# Patient Record
Sex: FEMALE | Race: WHITE | URBAN - METROPOLITAN AREA
[De-identification: names, ages, dates, MRNs, and addresses within clinical notes are randomized per-mention and may not be internally consistent; named-entity substitution may affect disease eponyms.]

---

## 2022-04-04 ENCOUNTER — APPOINTMENT (RX ONLY)
Dept: URBAN - METROPOLITAN AREA CLINIC 40 | Facility: CLINIC | Age: 62
Setting detail: DERMATOLOGY
End: 2022-04-04

## 2022-04-04 DIAGNOSIS — L81.4 OTHER MELANIN HYPERPIGMENTATION: ICD-10-CM | Status: STABLE

## 2022-04-04 DIAGNOSIS — B35.1 TINEA UNGUIUM: ICD-10-CM | Status: INADEQUATELY CONTROLLED

## 2022-04-04 DIAGNOSIS — L82.1 OTHER SEBORRHEIC KERATOSIS: ICD-10-CM | Status: STABLE

## 2022-04-04 DIAGNOSIS — D22 MELANOCYTIC NEVI: ICD-10-CM | Status: STABLE

## 2022-04-04 DIAGNOSIS — L20.89 OTHER ATOPIC DERMATITIS: ICD-10-CM | Status: INADEQUATELY CONTROLLED

## 2022-04-04 DIAGNOSIS — L72.11 PILAR CYST: ICD-10-CM | Status: STABLE

## 2022-04-04 DIAGNOSIS — L57.8 OTHER SKIN CHANGES DUE TO CHRONIC EXPOSURE TO NONIONIZING RADIATION: ICD-10-CM | Status: STABLE

## 2022-04-04 DIAGNOSIS — I78.8 OTHER DISEASES OF CAPILLARIES: ICD-10-CM | Status: STABLE

## 2022-04-04 DIAGNOSIS — D18.0 HEMANGIOMA: ICD-10-CM | Status: STABLE

## 2022-04-04 PROBLEM — D18.01 HEMANGIOMA OF SKIN AND SUBCUTANEOUS TISSUE: Status: ACTIVE | Noted: 2022-04-04

## 2022-04-04 PROBLEM — L20.84 INTRINSIC (ALLERGIC) ECZEMA: Status: ACTIVE | Noted: 2022-04-04

## 2022-04-04 PROBLEM — D22.9 MELANOCYTIC NEVI, UNSPECIFIED: Status: ACTIVE | Noted: 2022-04-04

## 2022-04-04 PROCEDURE — ? ADDITIONAL NOTES

## 2022-04-04 PROCEDURE — ? FULL BODY SKIN EXAM

## 2022-04-04 PROCEDURE — ? COUNSELING

## 2022-04-04 PROCEDURE — ? PRESCRIPTION MEDICATION MANAGEMENT

## 2022-04-04 PROCEDURE — ? PRESCRIPTION

## 2022-04-04 PROCEDURE — ? SUNSCREEN RECOMMENDATIONS

## 2022-04-04 PROCEDURE — 99203 OFFICE O/P NEW LOW 30 MIN: CPT

## 2022-04-04 RX ORDER — CLOBETASOL PROPIONATE 0.5 MG/G
CREAM TOPICAL
Qty: 60 | Refills: 0 | Status: ERX | COMMUNITY
Start: 2022-04-04

## 2022-04-04 RX ADMIN — CLOBETASOL PROPIONATE: 0.5 CREAM TOPICAL at 00:00

## 2022-04-04 ASSESSMENT — LOCATION SIMPLE DESCRIPTION DERM
LOCATION SIMPLE: LEFT ANTERIOR NECK
LOCATION SIMPLE: LEFT FOOT
LOCATION SIMPLE: LEFT HAND
LOCATION SIMPLE: CHEST
LOCATION SIMPLE: LEFT GREAT TOE
LOCATION SIMPLE: ABDOMEN
LOCATION SIMPLE: UPPER BACK
LOCATION SIMPLE: RIGHT SHOULDER
LOCATION SIMPLE: LEFT SHOULDER
LOCATION SIMPLE: RIGHT GREAT TOE
LOCATION SIMPLE: LOWER BACK
LOCATION SIMPLE: POSTERIOR SCALP
LOCATION SIMPLE: RIGHT HAND

## 2022-04-04 ASSESSMENT — LOCATION ZONE DERM
LOCATION ZONE: SCALP
LOCATION ZONE: TOENAIL
LOCATION ZONE: FEET
LOCATION ZONE: NECK
LOCATION ZONE: TRUNK
LOCATION ZONE: HAND
LOCATION ZONE: ARM

## 2022-04-04 ASSESSMENT — LOCATION DETAILED DESCRIPTION DERM
LOCATION DETAILED: UPPER STERNUM
LOCATION DETAILED: LEFT RADIAL DORSAL HAND
LOCATION DETAILED: RIGHT RADIAL DORSAL HAND
LOCATION DETAILED: LEFT SUPERIOR OCCIPITAL SCALP
LOCATION DETAILED: PERIUMBILICAL SKIN
LOCATION DETAILED: LEFT GREAT TOENAIL
LOCATION DETAILED: LEFT DORSAL FOOT
LOCATION DETAILED: RIGHT POSTERIOR SHOULDER
LOCATION DETAILED: SUPERIOR THORACIC SPINE
LOCATION DETAILED: EPIGASTRIC SKIN
LOCATION DETAILED: RIGHT GREAT TOENAIL
LOCATION DETAILED: SUPERIOR LUMBAR SPINE
LOCATION DETAILED: LEFT POSTERIOR SHOULDER
LOCATION DETAILED: LEFT CLAVICULAR NECK
LOCATION DETAILED: LEFT OCCIPITAL SCALP
LOCATION DETAILED: RIGHT ANTERIOR SHOULDER
LOCATION DETAILED: INFERIOR THORACIC SPINE

## 2022-04-04 NOTE — PROCEDURE: ADDITIONAL NOTES
Additional Notes: Patient consent was obtained to proceed with the visit and recommended plan of care after discussion of all risks and benefits, including the risks of COVID-19 exposure.
Detail Level: Simple
Additional Notes: - Defer the 4.5cm to general surgery
Detail Level: Zone
Render Risk Assessment In Note?: no

## 2022-04-04 NOTE — PROCEDURE: PRESCRIPTION MEDICATION MANAGEMENT
Initiate Treatment: - clobetasol 0.05 % topical cream \\nSig: Apply to the hands twice a day for up to 2 weeks, AVOID face, nipples, and groin area\\n\\n- OTC Cutemol moisturizer daily
Detail Level: Zone
Render In Strict Bullet Format?: Yes

## 2022-04-04 NOTE — HPI: FULL BODY SKIN EXAMINATION
What Type Of Note Output Would You Prefer (Optional)?: Standard Output
What Is The Reason For Today's Visit?: Full Body Skin Examination
What Is The Reason For Today's Visit? (Being Monitored For X): concerning skin lesions on an annual basis
How Severe Are Your Spot(S)?: mild
Additional History: Pt has some spots on both hands that get dry and itchy.

## 2022-04-04 NOTE — PROCEDURE: SUNSCREEN RECOMMENDATIONS
General Sunscreen Counseling: I recommended a broad spectrum sunscreen with a SPF of 50 or higher.  I explained that SPF 50 sunscreens block approximately 97 percent of the sun's harmful rays.  Sunscreens should be applied at least 30 minutes prior to expected sun exposure and then every 2 hours after that as long as sun exposure continues. If swimming or exercising sunscreen should be reapplied every 45 minutes to an hour after getting wet or sweating.  One ounce, or the equivalent of a shot glass full of sunscreen, is adequate to protect the skin not covered by a bathing suit. I also recommended a lip balm with a sunscreen as well. Sun protective clothing can be used in lieu of sunscreen but must be worn the entire time you are exposed to the sun's rays.
Detail Level: Detailed
Detail Level: Zone

## 2022-04-22 ENCOUNTER — APPOINTMENT (RX ONLY)
Dept: URBAN - METROPOLITAN AREA CLINIC 40 | Facility: CLINIC | Age: 62
Setting detail: DERMATOLOGY
End: 2022-04-22

## 2022-04-22 DIAGNOSIS — L72.11 PILAR CYST: ICD-10-CM

## 2022-04-22 PROCEDURE — ? PUNCH EXCISION

## 2022-04-22 PROCEDURE — 11422 EXC H-F-NK-SP B9+MARG 1.1-2: CPT

## 2022-04-22 PROCEDURE — ? ADDITIONAL NOTES

## 2022-04-22 ASSESSMENT — LOCATION DETAILED DESCRIPTION DERM: LOCATION DETAILED: LEFT SUPERIOR PARIETAL SCALP

## 2022-04-22 ASSESSMENT — LOCATION ZONE DERM: LOCATION ZONE: SCALP

## 2022-04-22 ASSESSMENT — LOCATION SIMPLE DESCRIPTION DERM: LOCATION SIMPLE: SCALP

## 2022-04-22 NOTE — PROCEDURE: PUNCH EXCISION
Size Of Lesion In Cm: 2
X Size Of Lesion In Cm (Optional): 0
Anesthesia Volume In Cc: 6
Excision Method: 4 mm Punch
Repair Type: None (Simple)
Intermediate / Complex Repair - Final Wound Length In Cm: 1
Complex Requirements: Extensive Undermining Performed?: No
Undermining Type: Entire Wound
Debridement Text: The wound edges were debrided prior to proceeding with the closure to facilitate wound healing.
Helical Rim Text: The closure involved the helical rim.
Vermilion Border Text: The closure involved the vermilion border.
Nostril Rim Text: The closure involved the nostril rim.
Retention Suture Text: Retention sutures were placed to support the closure and prevent dehiscence.
Suture Removal: 14 days
Lab Facility: 3
Detail Level: Detailed
Excision Depth: adipose tissue
Medical Necessity Clause: The excision was medically necessary because the lesion which was excised was
Anesthesia Type: 1% lidocaine with epinephrine
Hemostasis: Manual Pressure
Estimated Blood Loss (Cc): minimal
Epidermal Sutures: 3-0 Prolene
Epidermal Closure: simple interrupted
Wound Care: Petrolatum
Dressing: dry sterile dressing
Complex Repair Preamble Text (Leave Blank If You Do Not Want): Extensive wide undermining was performed.
Intermediate Repair Preamble Text (Leave Blank If You Do Not Want): Undermining was performed with blunt dissection.
1.5 Mm Punch Excision Text: A 1.5 mm punch was used to make an initial incision over the lesion.  After this overlying column of skin was removed, blunt dissection was used to free the lesion from the surrounding tissues and the lesion was extirpated through the surgical opening made by the punch biopsy.
2 Mm Punch Excision Text: A 2 mm punch was used to make an initial incision over the lesion.  After this overlying column of skin was removed, blunt dissection was used to free the lesion from the surrounding tissues and the lesion was extirpated through the surgical opening made by the punch biopsy.
2.5 Mm Punch Excision Text: A 2.5 mm punch was used to make an initial incision over the lesion.  After this overlying column of skin was removed, blunt dissection was used to free the lesion from the surrounding tissues and the lesion was extirpated through the surgical opening made by the punch biopsy.
3 Mm Punch Excision Text: A 3 mm punch was used to make an initial incision over the lesion.  After this overlying column of skin was removed, blunt dissection was used to free the lesion from the surrounding tissues and the lesion was extirpated through the surgical opening made by the punch biopsy.
3.5 Mm Punch Excision Text: A 3.5 mm punch was used to make an initial incision over the lesion.  After this overlying column of skin was removed, blunt dissection was used to free the lesion from the surrounding tissues and the lesion was extirpated through the surgical opening made by the punch biopsy.
4 Mm Punch Excision Text: A 4 mm punch was used to make an initial incision over the lesion.  After this overlying column of skin was removed, blunt dissection was used to free the lesion from the surrounding tissues and the lesion was extirpated through the surgical opening made by the punch biopsy.
4.5 Mm Punch Excision Text: A 4.5 mm punch was used to make an initial incision over the lesion.  After this overlying column of skin was removed, blunt dissection was used to free the lesion from the surrounding tissues and the lesion was extirpated through the surgical opening made by the punch biopsy.
5 Mm Punch Excision Text: A 5 mm punch was used to make an initial incision over the lesion.  After this overlying column of skin was removed, blunt dissection was used to free the lesion from the surrounding tissues and the lesion was extirpated through the surgical opening made by the punch biopsy.
6 Mm Punch Excision Text: A 6 mm punch was used to make an initial incision over the lesion.  After this overlying column of skin was removed, blunt dissection was used to free the lesion from the surrounding tissues and the lesion was extirpated through the surgical opening made by the punch biopsy.
7 Mm Punch Excision Text: A 7 mm punch was used to make an initial incision over the lesion.  After this overlying column of skin was removed, blunt dissection was used to free the lesion from the surrounding tissues and the lesion was extirpated through the surgical opening made by the punch biopsy.
8 Mm Punch Excision Text: A 8 mm punch was used to make an initial incision over the lesion.  After this overlying column of skin was removed, blunt dissection was used to free the lesion from the surrounding tissues and the lesion was extirpated through the surgical opening made by the punch biopsy.
10 Mm Punch Excision Text: A 10 mm punch was used to make an initial incision over the lesion.  After this overlying column of skin was removed, blunt dissection was used to free the lesion from the surrounding tissues and the lesion was extirpated through the surgical opening made by the punch biopsy.
12 Mm Punch Excision Text: A 12 mm punch was used to make an initial incision over the lesion.  After this overlying column of skin was removed, blunt dissection was used to free the lesion from the surrounding tissues and the lesion was extirpated through the surgical opening made by the punch biopsy.
Purse String (Intermediate) Text: Given the location of the defect and the characteristics of the surrounding skin a purse string intermediate closure was deemed most appropriate.  Undermining was performed circumfirentially around the surgical defect.  A purse string suture was then placed and tightened.
Path Notes (To The Dermatopathologist): Please check margins.
Medical Necessity Clause: This procedure was medically necessary because the lesion that was treated was:
Consent was obtained from the patient. The risks and benefits to therapy were discussed in detail. Specifically, the risks of infection, scarring, bleeding, prolonged wound healing, incomplete removal, allergy to anesthesia, nerve injury and recurrence were addressed. Prior to the procedure, the treatment site was clearly identified and confirmed by the patient. All components of Universal Protocol/PAUSE Rule completed.
Render Post-Care Instructions In Note?: yes
Post-Care Instructions: I reviewed with the patient in detail post-care instructions. Patient is not to engage in any heavy lifting, exercise, or swimming for the next 14 days. Should the patient develop any fevers, chills, bleeding, severe pain patient will contact the office immediately.
Billing Type: Third-Party Bill

## 2022-04-25 ENCOUNTER — APPOINTMENT (RX ONLY)
Dept: URBAN - METROPOLITAN AREA CLINIC 40 | Facility: CLINIC | Age: 62
Setting detail: DERMATOLOGY
End: 2022-04-25

## 2022-04-25 DIAGNOSIS — L72.11 PILAR CYST: ICD-10-CM

## 2022-04-25 PROCEDURE — 11421 EXC H-F-NK-SP B9+MARG 0.6-1: CPT | Mod: 79

## 2022-04-25 PROCEDURE — ? PUNCH EXCISION

## 2022-04-25 ASSESSMENT — LOCATION SIMPLE DESCRIPTION DERM: LOCATION SIMPLE: POSTERIOR SCALP

## 2022-04-25 ASSESSMENT — LOCATION DETAILED DESCRIPTION DERM: LOCATION DETAILED: LEFT POSTERIOR PARIETAL SCALP

## 2022-04-25 ASSESSMENT — LOCATION ZONE DERM: LOCATION ZONE: SCALP

## 2022-04-25 NOTE — PROCEDURE: PUNCH EXCISION
Referring Physician (Optional): Tressa Saeed PA-C
Size Of Lesion In Cm: 1
X Size Of Lesion In Cm (Optional): 0
Anesthesia Volume In Cc: 6
Excision Method: 4 mm Punch
Repair Type: None (Simple)
Complex Requirements: Extensive Undermining Performed?: No
Undermining Type: Entire Wound
Debridement Text: The wound edges were debrided prior to proceeding with the closure to facilitate wound healing.
Helical Rim Text: The closure involved the helical rim.
Vermilion Border Text: The closure involved the vermilion border.
Nostril Rim Text: The closure involved the nostril rim.
Retention Suture Text: Retention sutures were placed to support the closure and prevent dehiscence.
Suture Removal: 10 days
Lab Facility: 3
Detail Level: Detailed
Excision Depth: adipose tissue
Medical Necessity Clause: The excision was medically necessary because the lesion which was excised was
Anesthesia Type: 1% lidocaine with epinephrine
Hemostasis: Manual Pressure
Estimated Blood Loss (Cc): minimal
Anesthesia Volume In Cc: 2
Epidermal Sutures: 3-0 Prolene
Epidermal Closure: simple interrupted
Wound Care: Petrolatum
Dressing: dry sterile dressing
Complex Repair Preamble Text (Leave Blank If You Do Not Want): Extensive wide undermining was performed.
Intermediate Repair Preamble Text (Leave Blank If You Do Not Want): Undermining was performed with blunt dissection.
1.5 Mm Punch Excision Text: A 1.5 mm punch was used to make an initial incision over the lesion.  After this overlying column of skin was removed, blunt dissection was used to free the lesion from the surrounding tissues and the lesion was extirpated through the surgical opening made by the punch biopsy.
2 Mm Punch Excision Text: A 2 mm punch was used to make an initial incision over the lesion.  After this overlying column of skin was removed, blunt dissection was used to free the lesion from the surrounding tissues and the lesion was extirpated through the surgical opening made by the punch biopsy.
2.5 Mm Punch Excision Text: A 2.5 mm punch was used to make an initial incision over the lesion.  After this overlying column of skin was removed, blunt dissection was used to free the lesion from the surrounding tissues and the lesion was extirpated through the surgical opening made by the punch biopsy.
3 Mm Punch Excision Text: A 3 mm punch was used to make an initial incision over the lesion.  After this overlying column of skin was removed, blunt dissection was used to free the lesion from the surrounding tissues and the lesion was extirpated through the surgical opening made by the punch biopsy.
3.5 Mm Punch Excision Text: A 3.5 mm punch was used to make an initial incision over the lesion.  After this overlying column of skin was removed, blunt dissection was used to free the lesion from the surrounding tissues and the lesion was extirpated through the surgical opening made by the punch biopsy.
4 Mm Punch Excision Text: A 4 mm punch was used to make an initial incision over the lesion.  After this overlying column of skin was removed, blunt dissection was used to free the lesion from the surrounding tissues and the lesion was extirpated through the surgical opening made by the punch biopsy.
4.5 Mm Punch Excision Text: A 4.5 mm punch was used to make an initial incision over the lesion.  After this overlying column of skin was removed, blunt dissection was used to free the lesion from the surrounding tissues and the lesion was extirpated through the surgical opening made by the punch biopsy.
5 Mm Punch Excision Text: A 5 mm punch was used to make an initial incision over the lesion.  After this overlying column of skin was removed, blunt dissection was used to free the lesion from the surrounding tissues and the lesion was extirpated through the surgical opening made by the punch biopsy.
6 Mm Punch Excision Text: A 6 mm punch was used to make an initial incision over the lesion.  After this overlying column of skin was removed, blunt dissection was used to free the lesion from the surrounding tissues and the lesion was extirpated through the surgical opening made by the punch biopsy.
7 Mm Punch Excision Text: A 7 mm punch was used to make an initial incision over the lesion.  After this overlying column of skin was removed, blunt dissection was used to free the lesion from the surrounding tissues and the lesion was extirpated through the surgical opening made by the punch biopsy.
8 Mm Punch Excision Text: A 8 mm punch was used to make an initial incision over the lesion.  After this overlying column of skin was removed, blunt dissection was used to free the lesion from the surrounding tissues and the lesion was extirpated through the surgical opening made by the punch biopsy.
10 Mm Punch Excision Text: A 10 mm punch was used to make an initial incision over the lesion.  After this overlying column of skin was removed, blunt dissection was used to free the lesion from the surrounding tissues and the lesion was extirpated through the surgical opening made by the punch biopsy.
12 Mm Punch Excision Text: A 12 mm punch was used to make an initial incision over the lesion.  After this overlying column of skin was removed, blunt dissection was used to free the lesion from the surrounding tissues and the lesion was extirpated through the surgical opening made by the punch biopsy.
Purse String (Intermediate) Text: Given the location of the defect and the characteristics of the surrounding skin a purse string intermediate closure was deemed most appropriate.  Undermining was performed circumfirentially around the surgical defect.  A purse string suture was then placed and tightened.
Path Notes (To The Dermatopathologist): Please check margins.
Medical Necessity Clause: This procedure was medically necessary because the lesion that was treated was:
Consent was obtained from the patient. The risks and benefits to therapy were discussed in detail. Specifically, the risks of infection, scarring, bleeding, prolonged wound healing, incomplete removal, allergy to anesthesia, nerve injury and recurrence were addressed. Prior to the procedure, the treatment site was clearly identified and confirmed by the patient. All components of Universal Protocol/PAUSE Rule completed.
Render Post-Care Instructions In Note?: yes
Post-Care Instructions: I reviewed with the patient in detail post-care instructions. Patient is not to engage in any heavy lifting, exercise, or swimming for the next 14 days. Should the patient develop any fevers, chills, bleeding, severe pain patient will contact the office immediately.
Billing Type: Third-Party Bill

## 2022-05-02 ENCOUNTER — APPOINTMENT (RX ONLY)
Dept: URBAN - METROPOLITAN AREA CLINIC 40 | Facility: CLINIC | Age: 62
Setting detail: DERMATOLOGY
End: 2022-05-02

## 2022-05-02 DIAGNOSIS — Z48.02 ENCOUNTER FOR REMOVAL OF SUTURES: ICD-10-CM | Status: RESOLVED

## 2022-05-02 PROCEDURE — ? SUTURE REMOVAL (GLOBAL PERIOD)

## 2022-05-02 PROCEDURE — 99024 POSTOP FOLLOW-UP VISIT: CPT

## 2022-05-02 ASSESSMENT — LOCATION DETAILED DESCRIPTION DERM
LOCATION DETAILED: LEFT POSTERIOR PARIETAL SCALP
LOCATION DETAILED: LEFT POSTERIOR PARIETAL SCALP

## 2022-05-02 ASSESSMENT — LOCATION SIMPLE DESCRIPTION DERM
LOCATION SIMPLE: POSTERIOR SCALP
LOCATION SIMPLE: POSTERIOR SCALP

## 2022-05-02 ASSESSMENT — LOCATION ZONE DERM
LOCATION ZONE: SCALP
LOCATION ZONE: SCALP

## 2022-05-02 NOTE — PROCEDURE: SUTURE REMOVAL (GLOBAL PERIOD)
Detail Level: Detailed
Add 56623 Cpt? (Important Note: In 2017 The Use Of 35347 Is Being Tracked By Cms To Determine Future Global Period Reimbursement For Global Periods): yes

## 2023-07-27 ENCOUNTER — APPOINTMENT (RX ONLY)
Dept: URBAN - METROPOLITAN AREA CLINIC 40 | Facility: CLINIC | Age: 63
Setting detail: DERMATOLOGY
End: 2023-07-27

## 2023-07-27 DIAGNOSIS — D18.0 HEMANGIOMA: ICD-10-CM | Status: STABLE

## 2023-07-27 DIAGNOSIS — L0390 CELLULITIS AND ABSCESS OF UNSPECIFIED SITES: ICD-10-CM | Status: INADEQUATELY CONTROLLED

## 2023-07-27 DIAGNOSIS — L72.11 PILAR CYST: ICD-10-CM | Status: STABLE

## 2023-07-27 DIAGNOSIS — D22 MELANOCYTIC NEVI: ICD-10-CM | Status: STABLE

## 2023-07-27 DIAGNOSIS — L57.8 OTHER SKIN CHANGES DUE TO CHRONIC EXPOSURE TO NONIONIZING RADIATION: ICD-10-CM | Status: STABLE

## 2023-07-27 DIAGNOSIS — L0391 CELLULITIS AND ABSCESS OF UNSPECIFIED SITES: ICD-10-CM | Status: INADEQUATELY CONTROLLED

## 2023-07-27 DIAGNOSIS — L82.1 OTHER SEBORRHEIC KERATOSIS: ICD-10-CM | Status: STABLE

## 2023-07-27 DIAGNOSIS — L81.4 OTHER MELANIN HYPERPIGMENTATION: ICD-10-CM | Status: STABLE

## 2023-07-27 DIAGNOSIS — L20.89 OTHER ATOPIC DERMATITIS: ICD-10-CM | Status: STABLE

## 2023-07-27 PROBLEM — D22.9 MELANOCYTIC NEVI, UNSPECIFIED: Status: ACTIVE | Noted: 2023-07-27

## 2023-07-27 PROBLEM — D18.01 HEMANGIOMA OF SKIN AND SUBCUTANEOUS TISSUE: Status: ACTIVE | Noted: 2023-07-27

## 2023-07-27 PROBLEM — L03.115 CELLULITIS OF RIGHT LOWER LIMB: Status: ACTIVE | Noted: 2023-07-27

## 2023-07-27 PROCEDURE — ? SUNSCREEN RECOMMENDATIONS

## 2023-07-27 PROCEDURE — ? COUNSELING

## 2023-07-27 PROCEDURE — ? PRESCRIPTION MEDICATION MANAGEMENT

## 2023-07-27 PROCEDURE — ? PRESCRIPTION

## 2023-07-27 PROCEDURE — ? DIAGNOSIS COMMENT

## 2023-07-27 PROCEDURE — ? FULL BODY SKIN EXAM

## 2023-07-27 PROCEDURE — ? PHOTO-DOCUMENTATION

## 2023-07-27 PROCEDURE — ? MEDICATION COUNSELING

## 2023-07-27 PROCEDURE — 99214 OFFICE O/P EST MOD 30 MIN: CPT

## 2023-07-27 RX ORDER — DOXYCYCLINE 100 MG/1
CAPSULE ORAL
Qty: 20 | Refills: 0 | Status: ERX | COMMUNITY
Start: 2023-07-27

## 2023-07-27 RX ORDER — CLOBETASOL PROPIONATE 0.5 MG/G
CREAM TOPICAL
Qty: 60 | Refills: 0 | Status: ERX

## 2023-07-27 RX ADMIN — DOXYCYCLINE: 100 CAPSULE ORAL at 00:00

## 2023-07-27 ASSESSMENT — LOCATION DETAILED DESCRIPTION DERM
LOCATION DETAILED: UPPER STERNUM
LOCATION DETAILED: LEFT POSTERIOR SHOULDER
LOCATION DETAILED: SUPERIOR THORACIC SPINE
LOCATION DETAILED: RIGHT LATERAL DISTAL PRETIBIAL REGION
LOCATION DETAILED: SUPERIOR LUMBAR SPINE
LOCATION DETAILED: LEFT INFERIOR POSTAURICULAR SKIN
LOCATION DETAILED: LEFT PROXIMAL PRETIBIAL REGION
LOCATION DETAILED: EPIGASTRIC SKIN
LOCATION DETAILED: RIGHT ANTERIOR SHOULDER
LOCATION DETAILED: RIGHT POSTERIOR SHOULDER
LOCATION DETAILED: LEFT CLAVICULAR NECK
LOCATION DETAILED: PERIUMBILICAL SKIN
LOCATION DETAILED: INFERIOR THORACIC SPINE

## 2023-07-27 ASSESSMENT — LOCATION SIMPLE DESCRIPTION DERM
LOCATION SIMPLE: UPPER BACK
LOCATION SIMPLE: ABDOMEN
LOCATION SIMPLE: RIGHT SHOULDER
LOCATION SIMPLE: LEFT SHOULDER
LOCATION SIMPLE: LOWER BACK
LOCATION SIMPLE: RIGHT PRETIBIAL REGION
LOCATION SIMPLE: POSTERIOR SCALP
LOCATION SIMPLE: LEFT PRETIBIAL REGION
LOCATION SIMPLE: CHEST
LOCATION SIMPLE: LEFT ANTERIOR NECK

## 2023-07-27 ASSESSMENT — LOCATION ZONE DERM
LOCATION ZONE: SCALP
LOCATION ZONE: ARM
LOCATION ZONE: LEG
LOCATION ZONE: TRUNK
LOCATION ZONE: NECK

## 2023-07-27 NOTE — PROCEDURE: MEDICATION COUNSELING
complains of pain/discomfort Rinvoq Counseling: I discussed with the patient the risks of Rinvoq therapy including but not limited to upper respiratory tract infections, shingles, cold sores, bronchitis, nausea, cough, fever, acne, and headache. Live vaccines should be avoided.  This medication has been linked to serious infections; higher rate of mortality; malignancy and lymphoproliferative disorders; major adverse cardiovascular events; thrombosis; thrombocytopenia, anemia, and neutropenia; lipid elevations; liver enzyme elevations; and gastrointestinal perforations.

## 2023-07-27 NOTE — PROCEDURE: MEDICATION COUNSELING
Never smoker Opzelura Pregnancy And Lactation Text: There is insufficient data to evaluate drug-associated risk for major birth defects, miscarriage, or other adverse maternal or fetal outcomes.  There is a pregnancy registry that monitors pregnancy outcomes in pregnant persons exposed to the medication during pregnancy.  It is unknown if this medication is excreted in breast milk.  Do not breastfeed during treatment and for about 4 weeks after the last dose.

## 2023-07-27 NOTE — HPI: EVALUATION OF SKIN LESION(S)
What Type Of Note Output Would You Prefer (Optional)?: Bullet Format
Hpi Title: Evaluation of Skin Lesions
Have Your Spot(S) Been Treated In The Past?: has not been treated
Additional History: Pt reports there is rash on the lower right leg, she states it’s been present for about 2 months she originally thought it was poison ivy, she says it’s occasionally does it and about 2 weeks ago it was burning. Pt has been using otc cortisone but doesn’t make it go away.

## 2023-07-27 NOTE — PROCEDURE: MEDICATION COUNSELING
Medication: Fenofibrate  Sig: unknown  Qty: unknown  Dosage: 160mg  Last Refill: unknown  Pharmacy: [facility]  Patient last seen: 10/12/16  Next appointment to be seen: none scheduled    Medication: Glimepiride  Sig: unknown  Qty: unknown  Dosage: 2mg  Last Refill: unknown  Pharmacy: [facility]  Patient last seen: 10/12/16  Next appointment to be seen: none scheduled    Medication: Lisinoprid  Sig: unknown  Qty: unknown  Dosage: 10mg  Last Refill:unknown  Pharmacy: [facility]  Patient last seen: 10/12/16  Next appointment to be seen: none scheduled    Medication: Metformin  Sig: unknown  Qty: unknown  Dosage: 500mg  Last Refill:unknown   Pharmacy: [facility]  Patient last seen: 10/12/16  Next appointment to be seen: none scheduled    Patient came in with a list of medications for refills but did not have most of the information.  He says he needs the scripts to go to [facility] but had no other information to give.     Drysol Counseling:  I discussed with the patient the risks of drysol/aluminum chloride including but not limited to skin rash, itching, irritation, burning.

## 2023-07-27 NOTE — PROCEDURE: PRESCRIPTION MEDICATION MANAGEMENT
Initiate Treatment: Doxycycline 100mg bid x10 days \\nClobetasol cream bid x3 weeks
Detail Level: Zone
Render In Strict Bullet Format?: Yes
Initiate Treatment: Clobetasol cream bid x10 days during flares

## 2023-07-27 NOTE — PROCEDURE: SUNSCREEN RECOMMENDATIONS
General Sunscreen Counseling: I recommended a broad spectrum sunscreen with a SPF of 50 or higher.  I explained that SPF 50 sunscreens block approximately 97 percent of the sun's harmful rays.  Sunscreens should be applied at least 30 minutes prior to expected sun exposure and then every 2 hours after that as long as sun exposure continues. If swimming or exercising sunscreen should be reapplied every 45 minutes to an hour after getting wet or sweating.  One ounce, or the equivalent of a shot glass full of sunscreen, is adequate to protect the skin not covered by a bathing suit. I also recommended a lip balm with a sunscreen as well. Sun protective clothing can be used in lieu of sunscreen but must be worn the entire time you are exposed to the sun's rays.
Detail Level: Zone
Detail Level: Detailed
Detail Level: Zone
Detail Level: Simple
Detail Level: Generalized

## 2023-07-27 NOTE — PROCEDURE: DIAGNOSIS COMMENT
Detail Level: Zone
Render Risk Assessment In Note?: no
Comment: 4cm subcutaneous nodule \\nReferred to general surgery if removal desired
Comment: 10x6cm plaque \\nRTC in 3 weeks

## 2023-08-24 ENCOUNTER — APPOINTMENT (RX ONLY)
Dept: URBAN - METROPOLITAN AREA CLINIC 40 | Facility: CLINIC | Age: 63
Setting detail: DERMATOLOGY
End: 2023-08-24

## 2023-08-24 DIAGNOSIS — L0390 CELLULITIS AND ABSCESS OF UNSPECIFIED SITES: ICD-10-CM | Status: RESOLVING

## 2023-08-24 DIAGNOSIS — L0391 CELLULITIS AND ABSCESS OF UNSPECIFIED SITES: ICD-10-CM | Status: RESOLVING

## 2023-08-24 PROBLEM — L03.115 CELLULITIS OF RIGHT LOWER LIMB: Status: ACTIVE | Noted: 2023-08-24

## 2023-08-24 PROCEDURE — 99213 OFFICE O/P EST LOW 20 MIN: CPT

## 2023-08-24 PROCEDURE — ? PHOTO-DOCUMENTATION

## 2023-08-24 PROCEDURE — ? COUNSELING

## 2023-08-24 PROCEDURE — ? PRESCRIPTION MEDICATION MANAGEMENT

## 2023-08-24 PROCEDURE — ? PRESCRIPTION

## 2023-08-24 PROCEDURE — ? ORDER TESTS

## 2023-08-24 RX ORDER — MUPIROCIN 20 MG/G
OINTMENT TOPICAL
Qty: 22 | Refills: 0 | Status: ERX | COMMUNITY
Start: 2023-08-24

## 2023-08-24 RX ADMIN — MUPIROCIN: 20 OINTMENT TOPICAL at 00:00

## 2023-08-24 ASSESSMENT — LOCATION ZONE DERM: LOCATION ZONE: LEG

## 2023-08-24 ASSESSMENT — LOCATION SIMPLE DESCRIPTION DERM: LOCATION SIMPLE: RIGHT PRETIBIAL REGION

## 2023-08-24 ASSESSMENT — LOCATION DETAILED DESCRIPTION DERM: LOCATION DETAILED: RIGHT LATERAL DISTAL PRETIBIAL REGION

## 2023-08-24 NOTE — PROCEDURE: PRESCRIPTION MEDICATION MANAGEMENT
Initiate Treatment: :\\n- mupirocin 2 % topical ointment to wound bid x 2 weeks
Plan: :\\n- Obtained bacterial cx. Hold on antibiotic pending results
Detail Level: Zone
Continue Regimen: :\\n- Clobetasol cream bid x 2 weeks
Render In Strict Bullet Format?: Yes

## 2023-08-24 NOTE — PROCEDURE: ORDER TESTS
Billing Type: Third-Party Bill
Bill For Surgical Tray: no
Performing Laboratory: [id number]
Expected Date Of Service: 08/24/2023

## 2023-09-20 ENCOUNTER — APPOINTMENT (RX ONLY)
Dept: URBAN - METROPOLITAN AREA CLINIC 40 | Facility: CLINIC | Age: 63
Setting detail: DERMATOLOGY
End: 2023-09-20

## 2023-09-20 DIAGNOSIS — L0391 CELLULITIS AND ABSCESS OF UNSPECIFIED SITES: ICD-10-CM | Status: INADEQUATELY CONTROLLED

## 2023-09-20 DIAGNOSIS — L0390 CELLULITIS AND ABSCESS OF UNSPECIFIED SITES: ICD-10-CM | Status: INADEQUATELY CONTROLLED

## 2023-09-20 PROBLEM — L03.115 CELLULITIS OF RIGHT LOWER LIMB: Status: ACTIVE | Noted: 2023-09-20

## 2023-09-20 PROCEDURE — ? DIAGNOSIS COMMENT

## 2023-09-20 PROCEDURE — ? PHOTO-DOCUMENTATION

## 2023-09-20 PROCEDURE — ? COUNSELING

## 2023-09-20 PROCEDURE — ? ORDER TESTS

## 2023-09-20 PROCEDURE — ? PRESCRIPTION MEDICATION MANAGEMENT

## 2023-09-20 PROCEDURE — ? PRESCRIPTION

## 2023-09-20 PROCEDURE — ? MEDICATION COUNSELING

## 2023-09-20 PROCEDURE — 99214 OFFICE O/P EST MOD 30 MIN: CPT

## 2023-09-20 RX ORDER — MUPIROCIN 20 MG/G
OINTMENT TOPICAL
Qty: 22 | Refills: 0 | Status: ERX

## 2023-09-20 RX ORDER — DOXYCYCLINE 100 MG/1
CAPSULE ORAL
Qty: 20 | Refills: 0 | Status: ERX | COMMUNITY
Start: 2023-09-20

## 2023-09-20 RX ORDER — DESOXIMETASONE 0.5 MG/G
OINTMENT TOPICAL
Qty: 60 | Refills: 1 | Status: ERX | COMMUNITY
Start: 2023-09-20

## 2023-09-20 RX ADMIN — DESOXIMETASONE: 0.5 OINTMENT TOPICAL at 00:00

## 2023-09-20 RX ADMIN — DOXYCYCLINE: 100 CAPSULE ORAL at 00:00

## 2023-09-20 ASSESSMENT — LOCATION SIMPLE DESCRIPTION DERM: LOCATION SIMPLE: RIGHT PRETIBIAL REGION

## 2023-09-20 ASSESSMENT — LOCATION DETAILED DESCRIPTION DERM: LOCATION DETAILED: RIGHT LATERAL DISTAL PRETIBIAL REGION

## 2023-09-20 ASSESSMENT — LOCATION ZONE DERM: LOCATION ZONE: LEG

## 2023-09-20 NOTE — PROCEDURE: PRESCRIPTION MEDICATION MANAGEMENT
Initiate Treatment: :\\n- mupirocin 2 % topical ointment to wound bid x 2 weeks\\n \\n- doxycycline monohydrate 100 mg capsule \\nSig: Take 1 pill po bid x10 days\\n\\n- Topicort 0.05 % topical ointment \\nSig: Apply to aa on leg 2 times daily.
Detail Level: Zone
Render In Strict Bullet Format?: Yes

## 2023-09-20 NOTE — PROCEDURE: ORDER TESTS
Billing Type: Third-Party Bill
Bill For Surgical Tray: no
Performing Laboratory: [id number]
Expected Date Of Service: 09/20/2023
Lab Facility: [id number]

## 2023-09-20 NOTE — PROCEDURE: MEDICATION COUNSELING
Post-Care Instructions: I reviewed with the patient in detail post-care instructions. Patient is to wear sunprotection, and avoid picking at any of the treated lesions. Pt may apply Vaseline to crusted or scabbing areas. Detail Level: Detailed Consent: The patient's consent was obtained including but not limited to risks of crusting, scabbing, blistering, scarring, darker or lighter pigmentary change, recurrence, incomplete removal and infection. Render Post-Care Instructions In Note?: yes Duration Of Freeze Thaw-Cycle (Seconds): 0 Nsaids Pregnancy And Lactation Text: These medications are considered safe up to 30 weeks gestation. It is excreted in breast milk.

## 2023-09-20 NOTE — PROCEDURE: DIAGNOSIS COMMENT
Render Risk Assessment In Note?: no
Comment: Pedicure Q 3-4 weeks.  Patient will hold for now.  If no improvement at f/u, perform bx to r/o atypical mycobacterium.
Detail Level: Zone

## 2023-10-11 ENCOUNTER — APPOINTMENT (RX ONLY)
Dept: URBAN - METROPOLITAN AREA CLINIC 40 | Facility: CLINIC | Age: 63
Setting detail: DERMATOLOGY
End: 2023-10-11

## 2023-10-11 DIAGNOSIS — R21 RASH AND OTHER NONSPECIFIC SKIN ERUPTION: ICD-10-CM | Status: INADEQUATELY CONTROLLED

## 2023-10-11 PROCEDURE — ? DIAGNOSIS COMMENT

## 2023-10-11 PROCEDURE — ? PRESCRIPTION

## 2023-10-11 PROCEDURE — ? MEDICATION COUNSELING

## 2023-10-11 PROCEDURE — ? COUNSELING

## 2023-10-11 PROCEDURE — 11104 PUNCH BX SKIN SINGLE LESION: CPT

## 2023-10-11 PROCEDURE — ? ORDER TESTS

## 2023-10-11 PROCEDURE — ? PUNCH BIOPSY FOR TISSUE CULTURE

## 2023-10-11 PROCEDURE — ? PRESCRIPTION MEDICATION MANAGEMENT

## 2023-10-11 RX ORDER — MINOCYCLINE HYDROCHLORIDE 100 MG/1
CAPSULE ORAL
Qty: 60 | Refills: 0 | Status: ERX | COMMUNITY
Start: 2023-10-11

## 2023-10-11 RX ADMIN — MINOCYCLINE HYDROCHLORIDE: 100 CAPSULE ORAL at 00:00

## 2023-10-11 ASSESSMENT — LOCATION ZONE DERM: LOCATION ZONE: LEG

## 2023-10-11 ASSESSMENT — LOCATION DETAILED DESCRIPTION DERM
LOCATION DETAILED: RIGHT DISTAL PRETIBIAL REGION
LOCATION DETAILED: RIGHT LATERAL DISTAL PRETIBIAL REGION

## 2023-10-11 ASSESSMENT — LOCATION SIMPLE DESCRIPTION DERM: LOCATION SIMPLE: RIGHT PRETIBIAL REGION

## 2023-10-11 NOTE — PROCEDURE: MEDICATION COUNSELING
oral SSKI Counseling:  I discussed with the patient the risks of SSKI including but not limited to thyroid abnormalities, metallic taste, GI upset, fever, headache, acne, arthralgias, paraesthesias, lymphadenopathy, easy bleeding, arrhythmias, and allergic reaction.

## 2023-10-11 NOTE — PROCEDURE: ORDER TESTS
Expected Date Of Service: 10/11/2023
Performing Laboratory: 0
Billing Type: Third-Party Bill
Bill For Surgical Tray: no

## 2023-10-11 NOTE — PROCEDURE: MEDICATION COUNSELING
Ivermectin Pregnancy And Lactation Text: This medication is Pregnancy Category C and it isn't known if it is safe during pregnancy. It is also excreted in breast milk. Imiquimod Pregnancy And Lactation Text: This medication is Pregnancy Category C. It is unknown if this medication is excreted in breast milk.

## 2023-10-11 NOTE — PROCEDURE: PRESCRIPTION MEDICATION MANAGEMENT
2022      Gisela CARTWRIGHT Tala  [address]  [facility] [address]        Dear Ms. Edgar,    Your COVID-19 pre-surgical testing appointment has been scheduled on , at 4:00pm. You do not need to arrive 15 minutes prior to your visit.     [facility]  [address]  [facility] [address]    · Your provider will let you know what your COVID-19 test results are and give you any special instructions that may be needed, based on those results.  · You want to be as healthy as possible when you have your procedure. Until your procedure you should do the following:    - Practice social distancing.  - Use good hand hygiene.  - Check for symptoms daily.    We look forward to your arrival; please call Labor and Delivery prior to leaving your home to confirm your scheduled time.  On occasion, due to urgent-emergent situations on the Labor and Delivery unit, your scheduled  Section time may change, so it is important that you call first.    Labor and Delivery's direct phone number: [phone]    Please check into Labor and Delivery [facility]    [facility]  [address] [place] [address]  [phone]    Date of Delivery: Friday - 2022  Arrival Time: 9:30am  Procedure Time: 11:30am    Important Instructions:  • Do not take any anti-inflammatory medications (aspirin, ibuprofen, naproxen, etc.) for 5 days before surgery.  Acetaminophen (Tylenol) is acceptable.  • You will need someone to drive you home and remain with you up to 24 hours after you have been discharged.   · Do not eat or drink 8 hours prior to your surgery.  · Please bring a list of your medications (include the name, dose and how often you take them, and the time the last dose was taken).  · If you have scheduled medications to take, please check with your physician before taking them.  If you are told to take medications, take medications with 
Initiate Treatment: :\\n- Minocycline 100 mg capsule \\nQuantity: 60.0 Capsule  Days Supply: 30\\nSig: Take one pill twice daily with food and water.
Detail Level: Zone
small sips of water.  · Please brush your teeth prior to arriving, but do not swallow any water or mouthwash.  · Please shower the night before or the morning of your scheduled  Section. Please shower with HIBICLENS pre-surgical scrub.  **Please use a clean washcloth and towel, wear clean pajamas, and have clean sheets on your bed**  · Please do not wear makeup or use lotions, deodorants, oils or hair sprays.  · Please remove nail polish and artificial nails  · Please do not shave hair or use other methods of hair removal on or near the incision site for the 48 hours prior to your scheduled  Section.  If hair needs to be removed, it will be done at the hospital prior to your  Section.  · Please leave jewelry (including rings, earrings, etc.), watches, and other valuables at home.  · Please be sure to bring health insurance cards or forms and a photo ID.  · Please arrive on time at the hospital to ensure your delivery occurs on time.      This procedure requires a special cleansing soap called Hibiclens. The bottle of 4% Chlorhexidine Gluconate Soap (Hibiclens) can be picked up at your 36-week appointment or purchased at your local pharmacy. Please follow instructions either the night before or morning of your procedure.    INSTRUCTIONS:  1. Wet your body and hair with warm water.  2. Wash your hair and face as you regularly do. Rinse thoroughly.  These steps are done first so that the Chlorhexidine soap isn't washed off by your shampoo or face wash.  3. Turn the water off.  4. Apply 4 oz. of the Chlorhexidine soap to your entire body from your chin down using a clean washcloth.  5. Avoid contact with your eyes, ears, and mouth.  6. Be sure to include under your armpits, behind your knees, between skin folds, under fingernails, and your groin area (where legs meet the body).  7. Leave soap on your body for 1 minute.  8. Rinse thoroughly with warm water.  Do not wash with any other soap or 
Continue Regimen: :\\n- mupirocin 2 % topical ointment to wound bid x 2 weeks\\n- Topicort 0.05 % topical ointment \\nSig: Apply to aa on leg 2 times daily.
cleanser.  9. Dry your body with a clean, freshly laundered towel.  10. Put on freshly laundered clothes.      Thank you,     [facility]  [facility]  [phone]    
Discontinue Regimen: :\\n- doxycycline monohydrate
Render In Strict Bullet Format?: Yes

## 2023-10-11 NOTE — PROCEDURE: PUNCH BIOPSY FOR TISSUE CULTURE
Procedure Information: This plan will only bill for the biopsy.  The individual tests must be ordered in the 'Order Test' plan.  Bacterial Tissue Cultures: 20474-3.  Fungal Tissue Cultures:  578-5.  Mycobacterium Tissue Cultures: 540-5.
Detail Level: Detailed
Size Of Lesion In Cm (Optional): 0
Anesthesia Type: 1% lidocaine with epinephrine
Anesthesia Volume In Cc (Will Not Render If 0): 0.5
Hemostasis: None
Wound Care: Petrolatum
Dressing: bandage
Punch Size In Mm: 4
Depth Of Punch Biopsy: dermis
Epidermal Sutures: 4-0 Nylon
Number Of Epidermal Sutures (Optional): 3
Suture Removal: 14 days
Patient Will Remove Sutures At Home?: No
Consent: Written consent was obtained and risks were reviewed including but not limited to scarring, infection, bleeding, scabbing, nerve damage and allergy to anesthesia.
Post-Care Instructions: I reviewed with the patient in detail post-care instructions. Patient is to keep the biopsy site dry overnight, and then apply bacitracin twice daily until healed. Patient may apply hydrogen peroxide soaks to remove any crusting.
Home Suture Removal Text: Patient was provided a home suture removal kit and will remove their sutures at home.  If they have any questions or difficulties they will call the office.
Notification Instructions: Patient will be notified of culture results when they are available. Patient was informed that tissue cultures can take some time to complete and that we will inform them when we get a result.

## 2023-10-11 NOTE — PROCEDURE: DIAGNOSIS COMMENT
Comment: Initially treated as cellulitis by Urgent Care.  Bacterial cx normal.  Patient routinely gets pedicures.  Hold off for now.  R/O atypical mycobacteria.  Obtained fresh tissue culture. Start MCN 100mg 1 po BID x 1M. d/c DCN.
Detail Level: Zone
Render Risk Assessment In Note?: no

## 2023-11-29 ENCOUNTER — APPOINTMENT (RX ONLY)
Dept: URBAN - METROPOLITAN AREA CLINIC 40 | Facility: CLINIC | Age: 63
Setting detail: DERMATOLOGY
End: 2023-11-29

## 2023-11-29 DIAGNOSIS — R21 RASH AND OTHER NONSPECIFIC SKIN ERUPTION: ICD-10-CM | Status: IMPROVED

## 2023-11-29 PROCEDURE — ? COUNSELING

## 2023-11-29 PROCEDURE — 99214 OFFICE O/P EST MOD 30 MIN: CPT

## 2023-11-29 PROCEDURE — ? PRESCRIPTION

## 2023-11-29 PROCEDURE — ? DIAGNOSIS COMMENT

## 2023-11-29 PROCEDURE — ? MEDICATION COUNSELING

## 2023-11-29 PROCEDURE — ? PRESCRIPTION MEDICATION MANAGEMENT

## 2023-11-29 RX ORDER — MINOCYCLINE HYDROCHLORIDE 100 MG/1
CAPSULE ORAL
Qty: 60 | Refills: 0 | Status: ERX | COMMUNITY
Start: 2023-11-29

## 2023-11-29 RX ADMIN — MINOCYCLINE HYDROCHLORIDE: 100 CAPSULE ORAL at 00:00

## 2023-11-29 ASSESSMENT — LOCATION DETAILED DESCRIPTION DERM: LOCATION DETAILED: RIGHT LATERAL ANKLE

## 2023-11-29 ASSESSMENT — LOCATION SIMPLE DESCRIPTION DERM: LOCATION SIMPLE: RIGHT LOWER LEG

## 2023-11-29 ASSESSMENT — LOCATION ZONE DERM: LOCATION ZONE: LEG

## 2023-11-29 NOTE — PROCEDURE: PRESCRIPTION MEDICATION MANAGEMENT
Render In Strict Bullet Format?: Yes
Detail Level: Zone
Initiate Treatment: - Restart Minocycline 100mg one capsule twice daily for one month

## 2023-11-29 NOTE — PROCEDURE: MEDICATION COUNSELING
Topical Ketoconazole Pregnancy And Lactation Text: This medication is Pregnancy Category B and is considered safe during pregnancy. It is unknown if it is excreted in breast milk.
Dapsone Counseling: I discussed with the patient the risks of dapsone including but not limited to hemolytic anemia, agranulocytosis, rashes, methemoglobinemia, kidney failure, peripheral neuropathy, headaches, GI upset, and liver toxicity.  Patients who start dapsone require monitoring including baseline LFTs and weekly CBCs for the first month, then every month thereafter.  The patient verbalized understanding of the proper use and possible adverse effects of dapsone.  All of the patient's questions and concerns were addressed.
Libtayo Pregnancy And Lactation Text: This medication is contraindicated in pregnancy and when breast feeding.
Taltz Pregnancy And Lactation Text: The risk during pregnancy and breastfeeding is uncertain with this medication.
Calcipotriene Counseling:  I discussed with the patient the risks of calcipotriene including but not limited to erythema, scaling, itching, and irritation.
Sarecycline Pregnancy And Lactation Text: This medication is Pregnancy Category D and not consider safe during pregnancy. It is also excreted in breast milk.
Oral Minoxidil Pregnancy And Lactation Text: This medication should only be used when clearly needed if you are pregnant, attempting to become pregnant or breast feeding.
Tranexamic Acid Counseling:  Patient advised of the small risk of bleeding problems with tranexamic acid. They were also instructed to call if they developed any nausea, vomiting or diarrhea. All of the patient's questions and concerns were addressed.
Cephalexin Counseling: I counseled the patient regarding use of cephalexin as an antibiotic for prophylactic and/or therapeutic purposes. Cephalexin (commonly prescribed under brand name Keflex) is a cephalosporin antibiotic which is active against numerous classes of bacteria, including most skin bacteria. Side effects may include nausea, diarrhea, gastrointestinal upset, rash, hives, yeast infections, and in rare cases, hepatitis, kidney disease, seizures, fever, confusion, neurologic symptoms, and others. Patients with severe allergies to penicillin medications are cautioned that there is about a 10% incidence of cross-reactivity with cephalosporins. When possible, patients with penicillin allergies should use alternatives to cephalosporins for antibiotic therapy.
Birth Control Pills Pregnancy And Lactation Text: This medication should be avoided if pregnant and for the first 30 days post-partum.
Cyclophosphamide Pregnancy And Lactation Text: This medication is Pregnancy Category D and it isn't considered safe during pregnancy. This medication is excreted in breast milk.
Ivermectin Pregnancy And Lactation Text: This medication is Pregnancy Category C and it isn't known if it is safe during pregnancy. It is also excreted in breast milk.
Soolantra Counseling: I discussed with the patients the risks of topial Soolantra. This is a medicine which decreases the number of mites and inflammation in the skin. You experience burning, stinging, eye irritation or allergic reactions.  Please call our office if you develop any problems from using this medication.
Itraconazole Pregnancy And Lactation Text: This medication is Pregnancy Category C and it isn't know if it is safe during pregnancy. It is also excreted in breast milk.
Dupixent Pregnancy And Lactation Text: This medication likely crosses the placenta but the risk for the fetus is uncertain. This medication is excreted in breast milk.
Litfulo Pregnancy And Lactation Text: Based on animal studies, Lifulo may cause embryo-fetal harm when administered to pregnant women.  The medication should not be used in pregnancy.  Breastfeeding is not recommended during treatment.
Siliq Counseling:  I discussed with the patient the risks of Siliq including but not limited to new or worsening depression, suicidal thoughts and behavior, immunosuppression, malignancy, posterior leukoencephalopathy syndrome, and serious infections.  The patient understands that monitoring is required including a PPD at baseline and must alert us or the primary physician if symptoms of infection or other concerning signs are noted. There is also a special program designed to monitor depression which is required with Siliq.
Enbrel Pregnancy And Lactation Text: This medication is Pregnancy Category B and is considered safe during pregnancy. It is unknown if this medication is excreted in breast milk.
Metronidazole Pregnancy And Lactation Text: This medication is Pregnancy Category B and considered safe during pregnancy.  It is also excreted in breast milk.
Aklief counseling:  Patient advised to apply a pea-sized amount only at bedtime and wait 30 minutes after washing their face before applying.  If too drying, patient may add a non-comedogenic moisturizer.  The most commonly reported side effects including irritation, redness, scaling, dryness, stinging, burning, itching, and increased risk of sunburn.  The patient verbalized understanding of the proper use and possible adverse effects of retinoids.  All of the patient's questions and concerns were addressed.
Low Dose Naltrexone Pregnancy And Lactation Text: Naltrexone is pregnancy category C.  There have been no adequate and well-controlled studies in pregnant women.  It should be used in pregnancy only if the potential benefit justifies the potential risk to the fetus.   Limited data indicates that naltrexone is minimally excreted into breastmilk.
Acitretin Counseling:  I discussed with the patient the risks of acitretin including but not limited to hair loss, dry lips/skin/eyes, liver damage, hyperlipidemia, depression/suicidal ideation, photosensitivity.  Serious rare side effects can include but are not limited to pancreatitis, pseudotumor cerebri, bony changes, clot formation/stroke/heart attack.  Patient understands that alcohol is contraindicated since it can result in liver toxicity and significantly prolong the elimination of the drug by many years.
Enbrel Counseling:  I discussed with the patient the risks of etanercept including but not limited to myelosuppression, immunosuppression, autoimmune hepatitis, demyelinating diseases, lymphoma, and infections.  The patient understands that monitoring is required including a PPD at baseline and must alert us or the primary physician if symptoms of infection or other concerning signs are noted.
Winlevi Counseling:  I discussed with the patient the risks of topical clascoterone including but not limited to erythema, scaling, itching, and stinging. Patient voiced their understanding.
Mirvaso Counseling: Mirvaso is a topical medication which can decrease superficial blood flow where applied. Side effects are uncommon and include stinging, redness and allergic reactions.
Humira Counseling:  I discussed with the patient the risks of adalimumab including but not limited to myelosuppression, immunosuppression, autoimmune hepatitis, demyelinating diseases, lymphoma, and serious infections.  The patient understands that monitoring is required including a PPD at baseline and must alert us or the primary physician if symptoms of infection or other concerning signs are noted.
Opioid Counseling: I discussed with the patient the potential side effects of opioids including but not limited to addiction, altered mental status, and depression. I stressed avoiding alcohol, benzodiazepines, muscle relaxants and sleep aids unless specifically okayed by a physician. The patient verbalized understanding of the proper use and possible adverse effects of opioids. All of the patient's questions and concerns were addressed. They were instructed to flush the remaining pills down the toilet if they did not need them for pain.
Qbrexza Counseling:  I discussed with the patient the risks of Qbrexza including but not limited to headache, mydriasis, blurred vision, dry eyes, nasal dryness, dry mouth, dry throat, dry skin, urinary hesitation, and constipation.  Local skin reactions including erythema, burning, stinging, and itching can also occur.
Thalidomide Counseling: I discussed with the patient the risks of thalidomide including but not limited to birth defects, anxiety, weakness, chest pain, dizziness, cough and severe allergy.
Eucrisa Pregnancy And Lactation Text: This medication has not been assigned a Pregnancy Risk Category but animal studies failed to show danger with the topical medication. It is unknown if the medication is excreted in breast milk.
Calcipotriene Pregnancy And Lactation Text: The use of this medication during pregnancy or lactation is not recommended as there is insufficient data.
Topical Metronidazole Counseling: Metronidazole is a topical antibiotic medication. You may experience burning, stinging, redness, or allergic reactions.  Please call our office if you develop any problems from using this medication.
Cantharidin Counseling:  I discussed with the patient the risks of Cantharidin including but not limited to pain, redness, burning, itching, and blistering.
Cephalexin Pregnancy And Lactation Text: This medication is Pregnancy Category B and considered safe during pregnancy.  It is also excreted in breast milk but can be used safely for shorter doses.
Dapsone Pregnancy And Lactation Text: This medication is Pregnancy Category C and is not considered safe during pregnancy or breast feeding.
Opioid Pregnancy And Lactation Text: These medications can lead to premature delivery and should be avoided during pregnancy. These medications are also present in breast milk in small amounts.
Odomzo Counseling- I discussed with the patient the risks of Odomzo including but not limited to nausea, vomiting, diarrhea, constipation, weight loss, changes in the sense of taste, decreased appetite, muscle spasms, and hair loss.  The patient verbalized understanding of the proper use and possible adverse effects of Odomzo.  All of the patient's questions and concerns were addressed.
Tranexamic Acid Pregnancy And Lactation Text: It is unknown if this medication is safe during pregnancy or breast feeding.
Spironolactone Counseling: Patient advised regarding risks of diarrhea, abdominal pain, hyperkalemia, birth defects (for female patients), liver toxicity and renal toxicity. The patient may need blood work to monitor liver and kidney function and potassium levels while on therapy. The patient verbalized understanding of the proper use and possible adverse effects of spironolactone.  All of the patient's questions and concerns were addressed.
Ketoconazole Counseling:   Patient counseled regarding improving absorption with orange juice.  Adverse effects include but are not limited to breast enlargement, headache, diarrhea, nausea, upset stomach, liver function test abnormalities, taste disturbance, and stomach pain.  There is a rare possibility of liver failure that can occur when taking ketoconazole. The patient understands that monitoring of LFTs may be required, especially at baseline. The patient verbalized understanding of the proper use and possible adverse effects of ketoconazole.  All of the patient's questions and concerns were addressed.
Cyclosporine Counseling:  I discussed with the patient the risks of cyclosporine including but not limited to hypertension, gingival hyperplasia,myelosuppression, immunosuppression, liver damage, kidney damage, neurotoxicity, lymphoma, and serious infections. The patient understands that monitoring is required including baseline blood pressure, CBC, CMP, lipid panel and uric acid, and then 1-2 times monthly CMP and blood pressure.
Tetracycline Counseling: Patient counseled regarding possible photosensitivity and increased risk for sunburn.  Patient instructed to avoid sunlight, if possible.  When exposed to sunlight, patients should wear protective clothing, sunglasses, and sunscreen.  The patient was instructed to call the office immediately if the following severe adverse effects occur:  hearing changes, easy bruising/bleeding, severe headache, or vision changes.  The patient verbalized understanding of the proper use and possible adverse effects of tetracycline.  All of the patient's questions and concerns were addressed. Patient understands to avoid pregnancy while on therapy due to potential birth defects.
Otezla Counseling: The side effects of Otezla were discussed with the patient, including but not limited to worsening or new depression, weight loss, diarrhea, nausea, upper respiratory tract infection, and headache. Patient instructed to call the office should any adverse effect occur.  The patient verbalized understanding of the proper use and possible adverse effects of Otezla.  All the patient's questions and concerns were addressed.
Soolantra Pregnancy And Lactation Text: This medication is Pregnancy Category C. This medication is considered safe during breast feeding.
Methotrexate Counseling:  Patient counseled regarding adverse effects of methotrexate including but not limited to nausea, vomiting, abnormalities in liver function tests. Patients may develop mouth sores, rash, diarrhea, and abnormalities in blood counts. The patient understands that monitoring is required including LFT's and blood counts.  There is a rare possibility of scarring of the liver and lung problems that can occur when taking methotrexate. Persistent nausea, loss of appetite, pale stools, dark urine, cough, and shortness of breath should be reported immediately. Patient advised to discontinue methotrexate treatment at least three months before attempting to become pregnant.  I discussed the need for folate supplements while taking methotrexate.  These supplements can decrease side effects during methotrexate treatment. The patient verbalized understanding of the proper use and possible adverse effects of methotrexate.  All of the patient's questions and concerns were addressed.
Olumiant Counseling: I discussed with the patient the risks of Olumiant therapy including but not limited to upper respiratory tract infections, shingles, cold sores, and nausea. Live vaccines should be avoided.  This medication has been linked to serious infections; higher rate of mortality; malignancy and lymphoproliferative disorders; major adverse cardiovascular events; thrombosis; gastrointestinal perforations; neutropenia; lymphopenia; anemia; liver enzyme elevations; and lipid elevations.
Acitretin Pregnancy And Lactation Text: This medication is Pregnancy Category X and should not be given to women who are pregnant or may become pregnant in the future. This medication is excreted in breast milk.
Olumiant Pregnancy And Lactation Text: Based on animal studies, Olumiant may cause embryo-fetal harm when administered to pregnant women.  The medication should not be used in pregnancy.  Breastfeeding is not recommended during treatment.
Mirvaso Pregnancy And Lactation Text: This medication has not been assigned a Pregnancy Risk Category. It is unknown if the medication is excreted in breast milk.
Ketoconazole Pregnancy And Lactation Text: This medication is Pregnancy Category C and it isn't know if it is safe during pregnancy. It is also excreted in breast milk and breast feeding isn't recommended.
Winlevi Pregnancy And Lactation Text: This medication is considered safe during pregnancy and breastfeeding.
Qbrexza Pregnancy And Lactation Text: There is no available data on Qbrexza use in pregnant women.  There is no available data on Qbrexza use in lactation.
Niacinamide Counseling: I recommended taking niacin or niacinamide, also know as vitamin B3, twice daily. Recent evidence suggests that taking vitamin B3 (500 mg twice daily) can reduce the risk of actinic keratoses and non-melanoma skin cancers. Side effects of vitamin B3 include flushing and headache.
Hydroquinone Counseling:  Patient advised that medication may result in skin irritation, lightening (hypopigmentation), dryness, and burning.  In the event of skin irritation, the patient was advised to reduce the amount of the drug applied or use it less frequently.  Rarely, spots that are treated with hydroquinone can become darker (pseudoochronosis).  Should this occur, patient instructed to stop medication and call the office. The patient verbalized understanding of the proper use and possible adverse effects of hydroquinone.  All of the patient's questions and concerns were addressed.
Thalidomide Pregnancy And Lactation Text: This medication is Pregnancy Category X and is absolutely contraindicated during pregnancy. It is unknown if it is excreted in breast milk.
Tremfya Counseling: I discussed with the patient the risks of guselkumab including but not limited to immunosuppression, serious infections, worsening of inflammatory bowel disease and drug reactions.  The patient understands that monitoring is required including a PPD at baseline and must alert us or the primary physician if symptoms of infection or other concerning signs are noted.
Aklief Pregnancy And Lactation Text: It is unknown if this medication is safe to use during pregnancy.  It is unknown if this medication is excreted in breast milk.  Breastfeeding women should use the topical cream on the smallest area of the skin for the shortest time needed while breastfeeding.  Do not apply to nipple and areola.
Minocycline Counseling: Patient advised regarding possible photosensitivity and discoloration of the teeth, skin, lips, tongue and gums.  Patient instructed to avoid sunlight, if possible.  When exposed to sunlight, patients should wear protective clothing, sunglasses, and sunscreen.  The patient was instructed to call the office immediately if the following severe adverse effects occur:  hearing changes, easy bruising/bleeding, severe headache, or vision changes.  The patient verbalized understanding of the proper use and possible adverse effects of minocycline.  All of the patient's questions and concerns were addressed.
Cantharidin Pregnancy And Lactation Text: This medication has not been proven safe during pregnancy. It is unknown if this medication is excreted in breast milk.
Adbry Counseling: I discussed with the patient the risks of tralokinumab including but not limited to eye infection and irritation, cold sores, injection site reactions, worsening of asthma, allergic reactions and increased risk of parasitic infection.  Live vaccines should be avoided while taking tralokinumab. The patient understands that monitoring is required and they must alert us or the primary physician if symptoms of infection or other concerning signs are noted.
Clindamycin Counseling: I counseled the patient regarding use of clindamycin as an antibiotic for prophylactic and/or therapeutic purposes. Clindamycin is active against numerous classes of bacteria, including skin bacteria. Side effects may include nausea, diarrhea, gastrointestinal upset, rash, hives, yeast infections, and in rare cases, colitis.
Gabapentin Counseling: I discussed with the patient the risks of gabapentin including but not limited to dizziness, somnolence, fatigue and ataxia.
Rhofade Counseling: Rhofade is a topical medication which can decrease superficial blood flow where applied. Side effects are uncommon and include stinging, redness and allergic reactions.
Spironolactone Pregnancy And Lactation Text: This medication can cause feminization of the male fetus and should be avoided during pregnancy. The active metabolite is also found in breast milk.
Cyclosporine Pregnancy And Lactation Text: This medication is Pregnancy Category C and it isn't know if it is safe during pregnancy. This medication is excreted in breast milk.
5-Fu Counseling: 5-Fluorouracil Counseling:  I discussed with the patient the risks of 5-fluorouracil including but not limited to erythema, scaling, itching, weeping, crusting, and pain.
Otezla Pregnancy And Lactation Text: This medication is Pregnancy Category C and it isn't known if it is safe during pregnancy. It is unknown if it is excreted in breast milk.
Methotrexate Pregnancy And Lactation Text: This medication is Pregnancy Category X and is known to cause fetal harm. This medication is excreted in breast milk.
Topical Metronidazole Pregnancy And Lactation Text: This medication is Pregnancy Category B and considered safe during pregnancy.  It is also considered safe to use while breastfeeding.
Topical Retinoid counseling:  Patient advised to apply a pea-sized amount only at bedtime and wait 30 minutes after washing their face before applying.  If too drying, patient may add a non-comedogenic moisturizer. The patient verbalized understanding of the proper use and possible adverse effects of retinoids.  All of the patient's questions and concerns were addressed.
Valtrex Counseling: I discussed with the patient the risks of valacyclovir including but not limited to kidney damage, nausea, vomiting and severe allergy.  The patient understands that if the infection seems to be worsening or is not improving, they are to call.
Doxepin Pregnancy And Lactation Text: This medication is Pregnancy Category C and it isn't known if it is safe during pregnancy. It is also excreted in breast milk and breast feeding isn't recommended.
Simponi Counseling:  I discussed with the patient the risks of golimumab including but not limited to myelosuppression, immunosuppression, autoimmune hepatitis, demyelinating diseases, lymphoma, and serious infections.  The patient understands that monitoring is required including a PPD at baseline and must alert us or the primary physician if symptoms of infection or other concerning signs are noted.
Arava Counseling:  Patient counseled regarding adverse effects of Arava including but not limited to nausea, vomiting, abnormalities in liver function tests. Patients may develop mouth sores, rash, diarrhea, and abnormalities in blood counts. The patient understands that monitoring is required including LFTs and blood counts.  There is a rare possibility of scarring of the liver and lung problems that can occur when taking methotrexate. Persistent nausea, loss of appetite, pale stools, dark urine, cough, and shortness of breath should be reported immediately. Patient advised to discontinue Arava treatment and consult with a physician prior to attempting conception. The patient will have to undergo a treatment to eliminate Arava from the body prior to conception.
Prednisone Counseling:  I discussed with the patient the risks of prolonged use of prednisone including but not limited to weight gain, insomnia, osteoporosis, mood changes, diabetes, susceptibility to infection, glaucoma and high blood pressure.  In cases where prednisone use is prolonged, patients should be monitored with blood pressure checks, serum glucose levels and an eye exam.  Additionally, the patient may need to be placed on GI prophylaxis, PCP prophylaxis, and calcium and vitamin D supplementation and/or a bisphosphonate.  The patient verbalized understanding of the proper use and the possible adverse effects of prednisone.  All of the patient's questions and concerns were addressed.
Oxybutynin Counseling:  I discussed with the patient the risks of oxybutynin including but not limited to skin rash, drowsiness, dry mouth, difficulty urinating, and blurred vision.
Opzelura Counseling:  I discussed with the patient the risks of Opzelura including but not limited to nasopharngitis, bronchitis, ear infection, eosinophila, hives, diarrhea, folliculitis, tonsillitis, and rhinorrhea.  Taken orally, this medication has been linked to serious infections; higher rate of mortality; malignancy and lymphoproliferative disorders; major adverse cardiovascular events; thrombosis; thrombocytopenia, anemia, and neutropenia; and lipid elevations.
Terbinafine Counseling: Patient counseling regarding adverse effects of terbinafine including but not limited to headache, diarrhea, rash, upset stomach, liver function test abnormalities, itching, taste/smell disturbance, nausea, abdominal pain, and flatulence.  There is a rare possibility of liver failure that can occur when taking terbinafine.  The patient understands that a baseline LFT and kidney function test may be required. The patient verbalized understanding of the proper use and possible adverse effects of terbinafine.  All of the patient's questions and concerns were addressed.
Rinvoq Counseling: I discussed with the patient the risks of Rinvoq therapy including but not limited to upper respiratory tract infections, shingles, cold sores, bronchitis, nausea, cough, fever, acne, and headache. Live vaccines should be avoided.  This medication has been linked to serious infections; higher rate of mortality; malignancy and lymphoproliferative disorders; major adverse cardiovascular events; thrombosis; thrombocytopenia, anemia, and neutropenia; lipid elevations; liver enzyme elevations; and gastrointestinal perforations.
VTAMA Counseling: I discussed with the patient that VTAMA is not for use in the eyes, mouth or mouth. They should call the office if they develop any signs of allergic reactions to VTAMA. The patient verbalized understanding of the proper use and possible adverse effects of VTAMA.  All of the patient's questions and concerns were addressed.
Ilumya Counseling: I discussed with the patient the risks of tildrakizumab including but not limited to immunosuppression, malignancy, posterior leukoencephalopathy syndrome, and serious infections.  The patient understands that monitoring is required including a PPD at baseline and must alert us or the primary physician if symptoms of infection or other concerning signs are noted.
Niacinamide Pregnancy And Lactation Text: These medications are considered safe during pregnancy.
Bexarotene Counseling:  I discussed with the patient the risks of bexarotene including but not limited to hair loss, dry lips/skin/eyes, liver abnormalities, hyperlipidemia, pancreatitis, depression/suicidal ideation, photosensitivity, drug rash/allergic reactions, hypothyroidism, anemia, leukopenia, infection, cataracts, and teratogenicity.  Patient understands that they will need regular blood tests to check lipid profile, liver function tests, white blood cell count, thyroid function tests and pregnancy test if applicable.
Azelaic Acid Counseling: Patient counseled that medicine may cause skin irritation and to avoid applying near the eyes.  In the event of skin irritation, the patient was advised to reduce the amount of the drug applied or use it less frequently.   The patient verbalized understanding of the proper use and possible adverse effects of azelaic acid.  All of the patient's questions and concerns were addressed.
Adbry Pregnancy And Lactation Text: It is unknown if this medication will adversely affect pregnancy or breast feeding.
Cimzia Counseling:  I discussed with the patient the risks of Cimzia including but not limited to immunosuppression, allergic reactions and infections.  The patient understands that monitoring is required including a PPD at baseline and must alert us or the primary physician if symptoms of infection or other concerning signs are noted.
Valtrex Pregnancy And Lactation Text: this medication is Pregnancy Category B and is considered safe during pregnancy. This medication is not directly found in breast milk but it's metabolite acyclovir is present.
Topical Steroids Counseling: I discussed with the patient that prolonged use of topical steroids can result in the increased appearance of superficial blood vessels (telangiectasias), lightening (hypopigmentation) and thinning of the skin (atrophy).  Patient understands to avoid using high potency steroids in skin folds, the groin or the face.  The patient verbalized understanding of the proper use and possible adverse effects of topical steroids.  All of the patient's questions and concerns were addressed.
Gabapentin Pregnancy And Lactation Text: This medication is Pregnancy Category C and isn't considered safe during pregnancy. It is excreted in breast milk.
Imiquimod Counseling:  I discussed with the patient the risks of imiquimod including but not limited to erythema, scaling, itching, weeping, crusting, and pain.  Patient understands that the inflammatory response to imiquimod is variable from person to person and was educated regarded proper titration schedule.  If flu-like symptoms develop, patient knows to discontinue the medication and contact us.
Xolair Counseling:  Patient informed of potential adverse effects including but not limited to fever, muscle aches, rash and allergic reactions.  The patient verbalized understanding of the proper use and possible adverse effects of Xolair.  All of the patient's questions and concerns were addressed.
Clindamycin Pregnancy And Lactation Text: This medication can be used in pregnancy if certain situations. Clindamycin is also present in breast milk.
Hydroxyzine Counseling: Patient advised that the medication is sedating and not to drive a car after taking this medication.  Patient informed of potential adverse effects including but not limited to dry mouth, urinary retention, and blurry vision.  The patient verbalized understanding of the proper use and possible adverse effects of hydroxyzine.  All of the patient's questions and concerns were addressed.
Topical Retinoid Pregnancy And Lactation Text: This medication is Pregnancy Category C. It is unknown if this medication is excreted in breast milk.
5-Fu Pregnancy And Lactation Text: This medication is Pregnancy Category X and contraindicated in pregnancy and in women who may become pregnant. It is unknown if this medication is excreted in breast milk.
Detail Level: Zone
Opzelura Pregnancy And Lactation Text: There is insufficient data to evaluate drug-associated risk for major birth defects, miscarriage, or other adverse maternal or fetal outcomes.  There is a pregnancy registry that monitors pregnancy outcomes in pregnant persons exposed to the medication during pregnancy.  It is unknown if this medication is excreted in breast milk.  Do not breastfeed during treatment and for about 4 weeks after the last dose.
Terbinafine Pregnancy And Lactation Text: This medication is Pregnancy Category B and is considered safe during pregnancy. It is also excreted in breast milk and breast feeding isn't recommended.
Skyrizi Counseling: I discussed with the patient the risks of risankizumab-rzaa including but not limited to immunosuppression, and serious infections.  The patient understands that monitoring is required including a PPD at baseline and must alert us or the primary physician if symptoms of infection or other concerning signs are noted.
Dutasteride Male Counseling: Dustasteride Counseling:  I discussed with the patient the risks of use of dutasteride including but not limited to decreased libido, decreased ejaculate volume, and gynecomastia. Women who can become pregnant should not handle medication.  All of the patient's questions and concerns were addressed.
Drysol Counseling:  I discussed with the patient the risks of drysol/aluminum chloride including but not limited to skin rash, itching, irritation, burning.
Tazorac Counseling:  Patient advised that medication is irritating and drying.  Patient may need to apply sparingly and wash off after an hour before eventually leaving it on overnight.  The patient verbalized understanding of the proper use and possible adverse effects of tazorac.  All of the patient's questions and concerns were addressed.
Quinolones Counseling:  I discussed with the patient the risks of fluoroquinolones including but not limited to GI upset, allergic reaction, drug rash, diarrhea, dizziness, photosensitivity, yeast infections, liver function test abnormalities, tendonitis/tendon rupture.
Fluconazole Counseling:  Patient counseled regarding adverse effects of fluconazole including but not limited to headache, diarrhea, nausea, upset stomach, liver function test abnormalities, taste disturbance, and stomach pain.  There is a rare possibility of liver failure that can occur when taking fluconazole.  The patient understands that monitoring of LFTs and kidney function test may be required, especially at baseline. The patient verbalized understanding of the proper use and possible adverse effects of fluconazole.  All of the patient's questions and concerns were addressed.
Azelaic Acid Pregnancy And Lactation Text: This medication is considered safe during pregnancy and breast feeding.
Rinvoq Pregnancy And Lactation Text: Based on animal studies, Rinvoq may cause embryo-fetal harm when administered to pregnant women.  The medication should not be used in pregnancy.  Breastfeeding is not recommended during treatment and for 6 days after the last dose.
Vtama Pregnancy And Lactation Text: It is unknown if this medication can cause problems during pregnancy and breastfeeding.
Bexarotene Pregnancy And Lactation Text: This medication is Pregnancy Category X and should not be given to women who are pregnant or may become pregnant. This medication should not be used if you are breast feeding.
Azathioprine Counseling:  I discussed with the patient the risks of azathioprine including but not limited to myelosuppression, immunosuppression, hepatotoxicity, lymphoma, and infections.  The patient understands that monitoring is required including baseline LFTs, Creatinine, possible TPMP genotyping and weekly CBCs for the first month and then every 2 weeks thereafter.  The patient verbalized understanding of the proper use and possible adverse effects of azathioprine.  All of the patient's questions and concerns were addressed.
Nsaids Counseling: NSAID Counseling: I discussed with the patient that NSAIDs should be taken with food. Prolonged use of NSAIDs can result in the development of stomach ulcers.  Patient advised to stop taking NSAIDs if abdominal pain occurs.  The patient verbalized understanding of the proper use and possible adverse effects of NSAIDs.  All of the patient's questions and concerns were addressed.
Cimzia Pregnancy And Lactation Text: This medication crosses the placenta but can be considered safe in certain situations. Cimzia may be excreted in breast milk.
Use Enhanced Medication Counseling?: No
Xolair Pregnancy And Lactation Text: This medication is Pregnancy Category B and is considered safe during pregnancy. This medication is excreted in breast milk.
Glycopyrrolate Counseling:  I discussed with the patient the risks of glycopyrrolate including but not limited to skin rash, drowsiness, dry mouth, difficulty urinating, and blurred vision.
Hydroxyzine Pregnancy And Lactation Text: This medication is not safe during pregnancy and should not be taken. It is also excreted in breast milk and breast feeding isn't recommended.
Solaraze Counseling:  I discussed with the patient the risks of Solaraze including but not limited to erythema, scaling, itching, weeping, crusting, and pain.
Doxycycline Counseling:  Patient counseled regarding possible photosensitivity and increased risk for sunburn.  Patient instructed to avoid sunlight, if possible.  When exposed to sunlight, patients should wear protective clothing, sunglasses, and sunscreen.  The patient was instructed to call the office immediately if the following severe adverse effects occur:  hearing changes, easy bruising/bleeding, severe headache, or vision changes.  The patient verbalized understanding of the proper use and possible adverse effects of doxycycline.  All of the patient's questions and concerns were addressed.
Topical Steroids Applications Pregnancy And Lactation Text: Most topical steroids are considered safe to use during pregnancy and lactation.  Any topical steroid applied to the breast or nipple should be washed off before breastfeeding.
Tazorac Pregnancy And Lactation Text: This medication is not safe during pregnancy. It is unknown if this medication is excreted in breast milk.
Clofazimine Counseling:  I discussed with the patient the risks of clofazimine including but not limited to skin and eye pigmentation, liver damage, nausea/vomiting, gastrointestinal bleeding and allergy.
Doxycycline Pregnancy And Lactation Text: This medication is Pregnancy Category D and not consider safe during pregnancy. It is also excreted in breast milk but is considered safe for shorter treatment courses.
Propranolol Counseling:  I discussed with the patient the risks of propranolol including but not limited to low heart rate, low blood pressure, low blood sugar, restlessness and increased cold sensitivity. They should call the office if they experience any of these side effects.
Benzoyl Peroxide Counseling: Patient counseled that medicine may cause skin irritation and bleach clothing.  In the event of skin irritation, the patient was advised to reduce the amount of the drug applied or use it less frequently.   The patient verbalized understanding of the proper use and possible adverse effects of benzoyl peroxide.  All of the patient's questions and concerns were addressed.
Azathioprine Pregnancy And Lactation Text: This medication is Pregnancy Category D and isn't considered safe during pregnancy. It is unknown if this medication is excreted in breast milk.
Isotretinoin Counseling: Patient should get monthly blood tests, not donate blood, not drive at night if vision affected, not share medication, and not undergo elective surgery for 6 months after tx completed. Side effects reviewed, pt to contact office should one occur.
Zoryve Counseling:  I discussed with the patient that Zoryve is not for use in the eyes, mouth or vagina. The most commonly reported side effects include diarrhea, headache, insomnia, application site pain, upper respiratory tract infections, and urinary tract infections.  All of the patient's questions and concerns were addressed.
Infliximab Counseling:  I discussed with the patient the risks of infliximab including but not limited to myelosuppression, immunosuppression, autoimmune hepatitis, demyelinating diseases, lymphoma, and serious infections.  The patient understands that monitoring is required including a PPD at baseline and must alert us or the primary physician if symptoms of infection or other concerning signs are noted.
Azithromycin Counseling:  I discussed with the patient the risks of azithromycin including but not limited to GI upset, allergic reaction, drug rash, diarrhea, and yeast infections.
Sotyktu Counseling:  I discussed the most common side effects of Sotyktu including: common cold, sore throat, sinus infections, cold sores, canker sores, folliculitis, and acne.  I also discussed more serious side effects of Sotyktu including but not limited to: serious allergic reactions; increased risk for infections such as TB; cancers such as lymphomas; rhabdomyolysis and elevated CPK; and elevated triglycerides and liver enzymes. 
Nsaids Pregnancy And Lactation Text: These medications are considered safe up to 30 weeks gestation. It is excreted in breast milk.
Dutasteride Pregnancy And Lactation Text: This medication is absolutely contraindicated in women, especially during pregnancy and breast feeding. Feminization of male fetuses is possible if taking while pregnant.
Picato Counseling:  I discussed with the patient the risks of Picato including but not limited to erythema, scaling, itching, weeping, crusting, and pain.
Cibinqo Counseling: I discussed with the patient the risks of Cibinqo therapy including but not limited to common cold, nausea, headache, cold sores, increased blood CPK levels, dizziness, UTIs, fatigue, acne, and vomitting. Live vaccines should be avoided.  This medication has been linked to serious infections; higher rate of mortality; malignancy and lymphoproliferative disorders; major adverse cardiovascular events; thrombosis; thrombocytopenia and lymphopenia; lipid elevations; and retinal detachment.
Olanzapine Counseling- I discussed with the patient the common side effects of olanzapine including but are not limited to: lack of energy, dry mouth, increased appetite, sleepiness, tremor, constipation, dizziness, changes in behavior, or restlessness.  Explained that teenagers are more likely to experience headaches, abdominal pain, pain in the arms or legs, tiredness, and sleepiness.  Serious side effects include but are not limited: increased risk of death in elderly patients who are confused, have memory loss, or dementia-related psychosis; hyperglycemia; increased cholesterol and triglycerides; and weight gain.
Cellcept Counseling:  I discussed with the patient the risks of mycophenolate mofetil including but not limited to infection/immunosuppression, GI upset, hypokalemia, hypercholesterolemia, bone marrow suppression, lymphoproliferative disorders, malignancy, GI ulceration/bleed/perforation, colitis, interstitial lung disease, kidney failure, progressive multifocal leukoencephalopathy, and birth defects.  The patient understands that monitoring is required including a baseline creatinine and regular CBC testing. In addition, patient must alert us immediately if symptoms of infection or other concerning signs are noted.
Albendazole Counseling:  I discussed with the patient the risks of albendazole including but not limited to cytopenia, kidney damage, nausea/vomiting and severe allergy.  The patient understands that this medication is being used in an off-label manner.
Cosentyx Counseling:  I discussed with the patient the risks of Cosentyx including but not limited to worsening of Crohn's disease, immunosuppression, allergic reactions and infections.  The patient understands that monitoring is required including a PPD at baseline and must alert us or the primary physician if symptoms of infection or other concerning signs are noted.
Griseofulvin Counseling:  I discussed with the patient the risks of griseofulvin including but not limited to photosensitivity, cytopenia, liver damage, nausea/vomiting and severe allergy.  The patient understands that this medication is best absorbed when taken with a fatty meal (e.g., ice cream or french fries).
Benzoyl Peroxide Pregnancy And Lactation Text: This medication is Pregnancy Category C. It is unknown if benzoyl peroxide is excreted in breast milk.
Rifampin Counseling: I discussed with the patient the risks of rifampin including but not limited to liver damage, kidney damage, red-orange body fluids, nausea/vomiting and severe allergy.
Glycopyrrolate Pregnancy And Lactation Text: This medication is Pregnancy Category B and is considered safe during pregnancy. It is unknown if it is excreted breast milk.
Cimetidine Counseling:  I discussed with the patient the risks of Cimetidine including but not limited to gynecomastia, headache, diarrhea, nausea, drowsiness, arrhythmias, pancreatitis, skin rashes, psychosis, bone marrow suppression and kidney toxicity.
Klisyri Counseling:  I discussed with the patient the risks of Klisyri including but not limited to erythema, scaling, itching, weeping, crusting, and pain.
Topical Sulfur Applications Counseling: Topical Sulfur Counseling: Patient counseled that this medication may cause skin irritation or allergic reactions.  In the event of skin irritation, the patient was advised to reduce the amount of the drug applied or use it less frequently.   The patient verbalized understanding of the proper use and possible adverse effects of topical sulfur application.  All of the patient's questions and concerns were addressed.
Elidel Counseling: Patient may experience a mild burning sensation during topical application. Elidel is not approved in children less than 2 years of age. There have been case reports of hematologic and skin malignancies in patients using topical calcineurin inhibitors although causality is questionable.
Topical Sulfur Applications Pregnancy And Lactation Text: This medication is Pregnancy Category C and has an unknown safety profile during pregnancy. It is unknown if this topical medication is excreted in breast milk.
Erythromycin Counseling:  I discussed with the patient the risks of erythromycin including but not limited to GI upset, allergic reaction, drug rash, diarrhea, increase in liver enzymes, and yeast infections.
Stelara Counseling:  I discussed with the patient the risks of ustekinumab including but not limited to immunosuppression, malignancy, posterior leukoencephalopathy syndrome, and serious infections.  The patient understands that monitoring is required including a PPD at baseline and must alert us or the primary physician if symptoms of infection or other concerning signs are noted.
Propranolol Pregnancy And Lactation Text: This medication is Pregnancy Category C and it isn't known if it is safe during pregnancy. It is excreted in breast milk.
Isotretinoin Pregnancy And Lactation Text: This medication is Pregnancy Category X and is considered extremely dangerous during pregnancy. It is unknown if it is excreted in breast milk.
Finasteride Male Counseling: Finasteride Counseling:  I discussed with the patient the risks of use of finasteride including but not limited to decreased libido, decreased ejaculate volume, gynecomastia, and depression. Women should not handle medication.  All of the patient's questions and concerns were addressed.
Azithromycin Pregnancy And Lactation Text: This medication is considered safe during pregnancy and is also secreted in breast milk.
Topical Clindamycin Counseling: Patient counseled that this medication may cause skin irritation or allergic reactions.  In the event of skin irritation, the patient was advised to reduce the amount of the drug applied or use it less frequently.   The patient verbalized understanding of the proper use and possible adverse effects of clindamycin.  All of the patient's questions and concerns were addressed.
Sotyktu Pregnancy And Lactation Text: There is insufficient data to evaluate whether or not Sotyktu is safe to use during pregnancy.   It is not known if Sotyktu passes into breast milk and whether or not it is safe to use when breastfeeding.  
Erivedge Counseling- I discussed with the patient the risks of Erivedge including but not limited to nausea, vomiting, diarrhea, constipation, weight loss, changes in the sense of taste, decreased appetite, muscle spasms, and hair loss.  The patient verbalized understanding of the proper use and possible adverse effects of Erivedge.  All of the patient's questions and concerns were addressed.
Zyclara Counseling:  I discussed with the patient the risks of imiquimod including but not limited to erythema, scaling, itching, weeping, crusting, and pain.  Patient understands that the inflammatory response to imiquimod is variable from person to person and was educated regarded proper titration schedule.  If flu-like symptoms develop, patient knows to discontinue the medication and contact us.
Rituxan Counseling:  I discussed with the patient the risks of Rituxan infusions. Side effects can include infusion reactions, severe drug rashes including mucocutaneous reactions, reactivation of latent hepatitis and other infections and rarely progressive multifocal leukoencephalopathy.  All of the patient's questions and concerns were addressed.
Olanzapine Pregnancy And Lactation Text: This medication is pregnancy category C.   There are no adequate and well controlled trials with olanzapine in pregnant females.  Olanzapine should be used during pregnancy only if the potential benefit justifies the potential risk to the fetus.   In a study in lactating healthy women, olanzapine was excreted in breast milk.  It is recommended that women taking olanzapine should not breast feed.
Klisyri Pregnancy And Lactation Text: It is unknown if this medication can harm a developing fetus or if it is excreted in breast milk.
Finasteride Pregnancy And Lactation Text: This medication is absolutely contraindicated during pregnancy. It is unknown if it is excreted in breast milk.
Rifampin Pregnancy And Lactation Text: This medication is Pregnancy Category C and it isn't know if it is safe during pregnancy. It is also excreted in breast milk and should not be used if you are breast feeding.
High Dose Vitamin A Counseling: Side effects reviewed, pt to contact office should one occur.
Carac Counseling:  I discussed with the patient the risks of Carac including but not limited to erythema, scaling, itching, weeping, crusting, and pain.
Griseofulvin Pregnancy And Lactation Text: This medication is Pregnancy Category X and is known to cause serious birth defects. It is unknown if this medication is excreted in breast milk but breast feeding should be avoided.
Hydroxychloroquine Counseling:  I discussed with the patient that a baseline ophthalmologic exam is needed at the start of therapy and every year thereafter while on therapy. A CBC may also be warranted for monitoring.  The side effects of this medication were discussed with the patient, including but not limited to agranulocytosis, aplastic anemia, seizures, rashes, retinopathy, and liver toxicity. Patient instructed to call the office should any adverse effect occur.  The patient verbalized understanding of the proper use and possible adverse effects of Plaquenil.  All the patient's questions and concerns were addressed.
Cibinqo Pregnancy And Lactation Text: It is unknown if this medication will adversely affect pregnancy or breast feeding.  You should not take this medication if you are currently pregnant or planning a pregnancy or while breastfeeding.
Wartpeel Counseling:  I discussed with the patient the risks of Wartpeel including but not limited to erythema, scaling, itching, weeping, crusting, and pain.
Xeljanz Counseling: I discussed with the patient the risks of Xeljanz therapy including increased risk of infection, liver issues, headache, diarrhea, or cold symptoms. Live vaccines should be avoided. They were instructed to call if they have any problems.
SSKI Counseling:  I discussed with the patient the risks of SSKI including but not limited to thyroid abnormalities, metallic taste, GI upset, fever, headache, acne, arthralgias, paraesthesias, lymphadenopathy, easy bleeding, arrhythmias, and allergic reaction.
Bactrim Counseling:  I discussed with the patient the risks of sulfa antibiotics including but not limited to GI upset, allergic reaction, drug rash, diarrhea, dizziness, photosensitivity, and yeast infections.  Rarely, more serious reactions can occur including but not limited to aplastic anemia, agranulocytosis, methemoglobinemia, blood dyscrasias, liver or kidney failure, lung infiltrates or desquamative/blistering drug rashes.
Protopic Counseling: Patient may experience a mild burning sensation during topical application. Protopic is not approved in children less than 2 years of age. There have been case reports of hematologic and skin malignancies in patients using topical calcineurin inhibitors although causality is questionable.
Colchicine Counseling:  Patient counseled regarding adverse effects including but not limited to stomach upset (nausea, vomiting, stomach pain, or diarrhea).  Patient instructed to limit alcohol consumption while taking this medication.  Colchicine may reduce blood counts especially with prolonged use.  The patient understands that monitoring of kidney function and blood counts may be required, especially at baseline. The patient verbalized understanding of the proper use and possible adverse effects of colchicine.  All of the patient's questions and concerns were addressed.
Solaraze Pregnancy And Lactation Text: This medication is Pregnancy Category B and is considered safe. There is some data to suggest avoiding during the third trimester. It is unknown if this medication is excreted in breast milk.
Birth Control Pills Counseling: Birth Control Pill Counseling: I discussed with the patient the potential side effects of OCPs including but not limited to increased risk of stroke, heart attack, thrombophlebitis, deep venous thrombosis, hepatic adenomas, breast changes, GI upset, headaches, and depression.  The patient verbalized understanding of the proper use and possible adverse effects of OCPs. All of the patient's questions and concerns were addressed.
Itraconazole Counseling:  I discussed with the patient the risks of itraconazole including but not limited to liver damage, nausea/vomiting, neuropathy, and severe allergy.  The patient understands that this medication is best absorbed when taken with acidic beverages such as non-diet cola or ginger ale.  The patient understands that monitoring is required including baseline LFTs and repeat LFTs at intervals.  The patient understands that they are to contact us or the primary physician if concerning signs are noted.
Libtayo Counseling- I discussed with the patient the risks of Libtayo including but not limited to nausea, vomiting, diarrhea, and bone or muscle pain.  The patient verbalized understanding of the proper use and possible adverse effects of Libtayo.  All of the patient's questions and concerns were addressed.
Cyclophosphamide Counseling:  I discussed with the patient the risks of cyclophosphamide including but not limited to hair loss, hormonal abnormalities, decreased fertility, abdominal pain, diarrhea, nausea and vomiting, bone marrow suppression and infection. The patient understands that monitoring is required while taking this medication.
Oral Minoxidil Counseling- I discussed with the patient the risks of oral minoxidil including but not limited to shortness of breath, swelling of the feet or ankles, dizziness, lightheadedness, unwanted hair growth and allergic reaction.  The patient verbalized understanding of the proper use and possible adverse effects of oral minoxidil.  All of the patient's questions and concerns were addressed.
Bactrim Pregnancy And Lactation Text: This medication is Pregnancy Category D and is known to cause fetal risk.  It is also excreted in breast milk.
High Dose Vitamin A Pregnancy And Lactation Text: High dose vitamin A therapy is contraindicated during pregnancy and breast feeding.
Xelmookiez Pregnancy And Lactation Text: This medication is Pregnancy Category D and is not considered safe during pregnancy.  The risk during breast feeding is also uncertain.
Dupixent Counseling: I discussed with the patient the risks of dupilumab including but not limited to eye infection and irritation, cold sores, injection site reactions, worsening of asthma, allergic reactions and increased risk of parasitic infection.  Live vaccines should be avoided while taking dupilumab. Dupilumab will also interact with certain medications such as warfarin and cyclosporine. The patient understands that monitoring is required and they must alert us or the primary physician if symptoms of infection or other concerning signs are noted.
Minoxidil Counseling: Minoxidil is a topical medication which can increase blood flow where it is applied. It is uncertain how this medication increases hair growth. Side effects are uncommon and include stinging and allergic reactions.
Doxepin Counseling:  Patient advised that the medication is sedating and not to drive a car after taking this medication. Patient informed of potential adverse effects including but not limited to dry mouth, urinary retention, and blurry vision.  The patient verbalized understanding of the proper use and possible adverse effects of doxepin.  All of the patient's questions and concerns were addressed.
Erythromycin Pregnancy And Lactation Text: This medication is Pregnancy Category B and is considered safe during pregnancy. It is also excreted in breast milk.
Rituxan Pregnancy And Lactation Text: This medication is Pregnancy Category C and it isn't know if it is safe during pregnancy. It is unknown if this medication is excreted in breast milk but similar antibodies are known to be excreted.
Hydroxychloroquine Pregnancy And Lactation Text: This medication has been shown to cause fetal harm but it isn't assigned a Pregnancy Risk Category. There are small amounts excreted in breast milk.
Sarecycline Counseling: Patient advised regarding possible photosensitivity and discoloration of the teeth, skin, lips, tongue and gums.  Patient instructed to avoid sunlight, if possible.  When exposed to sunlight, patients should wear protective clothing, sunglasses, and sunscreen.  The patient was instructed to call the office immediately if the following severe adverse effects occur:  hearing changes, easy bruising/bleeding, severe headache, or vision changes.  The patient verbalized understanding of the proper use and possible adverse effects of sarecycline.  All of the patient's questions and concerns were addressed.
Litfulo Counseling: I discussed with the patient the risks of Litfulo therapy including but not limited to upper respiratory tract infections, shingles, cold sores, and nausea. Live vaccines should be avoided.  This medication has been linked to serious infections; higher rate of mortality; malignancy and lymphoproliferative disorders; major adverse cardiovascular events; thrombosis; gastrointestinal perforations; neutropenia; lymphopenia; anemia; liver enzyme elevations; and lipid elevations.
Ivermectin Counseling:  Patient instructed to take medication on an empty stomach with a full glass of water.  Patient informed of potential adverse effects including but not limited to nausea, diarrhea, dizziness, itching, and swelling of the extremities or lymph nodes.  The patient verbalized understanding of the proper use and possible adverse effects of ivermectin.  All of the patient's questions and concerns were addressed.
Metronidazole Counseling:  I discussed with the patient the risks of metronidazole including but not limited to seizures, nausea/vomiting, a metallic taste in the mouth, nausea/vomiting and severe allergy.
Low Dose Naltrexone Counseling- I discussed with the patient the potential risks and side effects of low dose naltrexone including but not limited to: more vivid dreams, headaches, nausea, vomiting, abdominal pain, fatigue, dizziness, and anxiety.
Taltz Counseling: I discussed with the patient the risks of ixekizumab including but not limited to immunosuppression, serious infections, worsening of inflammatory bowel disease and drug reactions.  The patient understands that monitoring is required including a PPD at baseline and must alert us or the primary physician if symptoms of infection or other concerning signs are noted.
Protopic Pregnancy And Lactation Text: This medication is Pregnancy Category C. It is unknown if this medication is excreted in breast milk when applied topically.
Sski Pregnancy And Lactation Text: This medication is Pregnancy Category D and isn't considered safe during pregnancy. It is excreted in breast milk.
Topical Ketoconazole Counseling: Patient counseled that this medication may cause skin irritation or allergic reactions.  In the event of skin irritation, the patient was advised to reduce the amount of the drug applied or use it less frequently.   The patient verbalized understanding of the proper use and possible adverse effects of ketoconazole.  All of the patient's questions and concerns were addressed.
Eucrisa Counseling: Patient may experience a mild burning sensation during topical application. Eucrisa is not approved in children less than 2 years of age.

## 2023-11-29 NOTE — PROCEDURE: DIAGNOSIS COMMENT
Detail Level: Zone
Render Risk Assessment In Note?: no
Comment: Much improvement noted at exam today. Pt reports she was admitted in the hospital for CHF a few weeks ago. Blood cultures were negative. They prescribed her Cipro. Case discussed with Dr. Sheriff, she concurs to defer tissue biopsy and continue with Minocycline. Advised pt to avoid pedicures.

## 2024-01-04 ENCOUNTER — APPOINTMENT (RX ONLY)
Dept: URBAN - METROPOLITAN AREA CLINIC 40 | Facility: CLINIC | Age: 64
Setting detail: DERMATOLOGY
End: 2024-01-04

## 2024-01-04 DIAGNOSIS — R21 RASH AND OTHER NONSPECIFIC SKIN ERUPTION: ICD-10-CM | Status: STABLE

## 2024-01-04 PROCEDURE — 99212 OFFICE O/P EST SF 10 MIN: CPT

## 2024-01-04 PROCEDURE — ? COUNSELING

## 2024-01-04 PROCEDURE — ? DIAGNOSIS COMMENT

## 2024-01-04 ASSESSMENT — LOCATION ZONE DERM: LOCATION ZONE: LEG

## 2024-01-04 ASSESSMENT — LOCATION SIMPLE DESCRIPTION DERM: LOCATION SIMPLE: RIGHT LOWER LEG

## 2024-01-04 ASSESSMENT — LOCATION DETAILED DESCRIPTION DERM: LOCATION DETAILED: RIGHT LATERAL ANKLE

## 2024-01-04 NOTE — PROCEDURE: DIAGNOSIS COMMENT
Detail Level: Zone
Render Risk Assessment In Note?: no
Comment: Much improvement noted at exam today, no open sores, only PIH. Advised pt to RTO if she notices any change
Comment: Residual PIE.  Initially treated as cellulitis by Urgent Care. Bacterial cx normal. Patient routinely gets pedicures. Hold on pedicures for now.

## 2024-09-11 ENCOUNTER — APPOINTMENT (RX ONLY)
Dept: URBAN - METROPOLITAN AREA CLINIC 40 | Facility: CLINIC | Age: 64
Setting detail: DERMATOLOGY
End: 2024-09-11

## 2024-09-11 DIAGNOSIS — B35.3 TINEA PEDIS: ICD-10-CM | Status: INADEQUATELY CONTROLLED

## 2024-09-11 DIAGNOSIS — D18.0 HEMANGIOMA: ICD-10-CM | Status: STABLE

## 2024-09-11 DIAGNOSIS — L72.11 PILAR CYST: ICD-10-CM | Status: STABLE

## 2024-09-11 DIAGNOSIS — L57.8 OTHER SKIN CHANGES DUE TO CHRONIC EXPOSURE TO NONIONIZING RADIATION: ICD-10-CM | Status: STABLE

## 2024-09-11 DIAGNOSIS — D22 MELANOCYTIC NEVI: ICD-10-CM | Status: STABLE

## 2024-09-11 DIAGNOSIS — L81.4 OTHER MELANIN HYPERPIGMENTATION: ICD-10-CM | Status: STABLE

## 2024-09-11 DIAGNOSIS — L82.1 OTHER SEBORRHEIC KERATOSIS: ICD-10-CM | Status: STABLE

## 2024-09-11 PROBLEM — D22.9 MELANOCYTIC NEVI, UNSPECIFIED: Status: ACTIVE | Noted: 2024-09-11

## 2024-09-11 PROBLEM — D18.01 HEMANGIOMA OF SKIN AND SUBCUTANEOUS TISSUE: Status: ACTIVE | Noted: 2024-09-11

## 2024-09-11 PROCEDURE — ? DIAGNOSIS COMMENT

## 2024-09-11 PROCEDURE — ? DEFER

## 2024-09-11 PROCEDURE — ? COUNSELING

## 2024-09-11 PROCEDURE — ? SUNSCREEN RECOMMENDATIONS

## 2024-09-11 PROCEDURE — ? FULL BODY SKIN EXAM

## 2024-09-11 PROCEDURE — 99213 OFFICE O/P EST LOW 20 MIN: CPT

## 2024-09-11 ASSESSMENT — LOCATION SIMPLE DESCRIPTION DERM
LOCATION SIMPLE: LEFT ANTERIOR NECK
LOCATION SIMPLE: LEFT SHOULDER
LOCATION SIMPLE: LOWER BACK
LOCATION SIMPLE: UPPER BACK
LOCATION SIMPLE: CHEST
LOCATION SIMPLE: RIGHT SHOULDER
LOCATION SIMPLE: POSTERIOR SCALP
LOCATION SIMPLE: ABDOMEN

## 2024-09-11 ASSESSMENT — LOCATION DETAILED DESCRIPTION DERM
LOCATION DETAILED: UPPER STERNUM
LOCATION DETAILED: SUPERIOR LUMBAR SPINE
LOCATION DETAILED: LEFT INFERIOR POSTAURICULAR SKIN
LOCATION DETAILED: LEFT CLAVICULAR NECK
LOCATION DETAILED: SUPERIOR THORACIC SPINE
LOCATION DETAILED: INFERIOR THORACIC SPINE
LOCATION DETAILED: LEFT POSTERIOR SHOULDER
LOCATION DETAILED: EPIGASTRIC SKIN
LOCATION DETAILED: RIGHT ANTERIOR SHOULDER
LOCATION DETAILED: RIGHT POSTERIOR SHOULDER
LOCATION DETAILED: PERIUMBILICAL SKIN

## 2024-09-11 ASSESSMENT — LOCATION ZONE DERM
LOCATION ZONE: ARM
LOCATION ZONE: TRUNK
LOCATION ZONE: SCALP
LOCATION ZONE: NECK

## 2024-09-11 NOTE — HPI: EVALUATION OF SKIN LESION(S)
What Type Of Note Output Would You Prefer (Optional)?: Bullet Format
Hpi Title: Evaluation of Skin Lesions
How Severe Are Your Spot(S)?: mild
Additional History: At this times, patient has no other medical complaints.

## 2024-09-11 NOTE — PROCEDURE: DEFER
Detail Level: Detailed
Size Of Lesion In Cm (Optional): 4
Introduction Text (Please End With A Colon): Following procedure was deferred:
Procedure To Be Performed At Next Visit: Excision
X Size Of Lesion In Cm (Optional): 0

## 2024-09-30 NOTE — PROCEDURE: MEDICATION COUNSELING
General Sunscreen Counseling: I recommended a broad spectrum sunscreen with a SPF of 30 or higher.  I explained that SPF 30 sunscreens block approximately 97 percent of the sun's harmful rays.  Sunscreens should be applied at least 15 minutes prior to expected sun exposure and then every 2 hours after that as long as sun exposure continues. If swimming or exercising sunscreen should be reapplied every 45 minutes to an hour after getting wet or sweating.  One ounce, or the equivalent of a shot glass full of sunscreen, is adequate to protect the skin not covered by a bathing suit. I also recommended a lip balm with a sunscreen as well. Sun protective clothing can be used in lieu of sunscreen but must be worn the entire time you are exposed to the sun's rays. Detail Level: Zone Eucrisa Counseling: Patient may experience a mild burning sensation during topical application. Eucrisa is not approved in children less than 2 years of age.